# Patient Record
Sex: MALE | Race: WHITE | NOT HISPANIC OR LATINO | Employment: FULL TIME | URBAN - METROPOLITAN AREA
[De-identification: names, ages, dates, MRNs, and addresses within clinical notes are randomized per-mention and may not be internally consistent; named-entity substitution may affect disease eponyms.]

---

## 2017-01-30 ENCOUNTER — TELEPHONE (OUTPATIENT)
Dept: NEUROLOGY | Facility: HOSPITAL | Age: 62
End: 2017-01-30

## 2017-01-30 NOTE — TELEPHONE ENCOUNTER
Returned pt's call to inform that he needs to stop CapTem at least 2 weeks prior to TACE so that his platelets and WBC can return to normal. Pt verbalized understanding.

## 2017-01-30 NOTE — TELEPHONE ENCOUNTER
----- Message from Madiha Desir sent at 1/30/2017 10:46 AM CST -----  EAW- Patient wants to know if he needs to stop camptem medication before he has his second Tace on 3/10/17. Please call patient at [phone]

## 2017-02-16 DIAGNOSIS — C7B.8 SECONDARY NEUROENDOCRINE TUMOR OF LIVER: Primary | ICD-10-CM

## 2017-03-09 ENCOUNTER — OFFICE VISIT (OUTPATIENT)
Dept: NEUROLOGY | Facility: HOSPITAL | Age: 62
End: 2017-03-09
Attending: SURGERY
Payer: COMMERCIAL

## 2017-03-09 ENCOUNTER — LAB VISIT (OUTPATIENT)
Dept: LAB | Facility: HOSPITAL | Age: 62
End: 2017-03-09
Attending: SURGERY
Payer: COMMERCIAL

## 2017-03-09 VITALS
TEMPERATURE: 98 F | HEART RATE: 81 BPM | WEIGHT: 204 LBS | HEIGHT: 68 IN | DIASTOLIC BLOOD PRESSURE: 86 MMHG | SYSTOLIC BLOOD PRESSURE: 126 MMHG | BODY MASS INDEX: 30.92 KG/M2

## 2017-03-09 DIAGNOSIS — C7B.8 SECONDARY NEUROENDOCRINE TUMOR OF LIVER: ICD-10-CM

## 2017-03-09 DIAGNOSIS — C7B.8 SECONDARY NEUROENDOCRINE TUMOR OF DISTANT LYMPH NODES: ICD-10-CM

## 2017-03-09 DIAGNOSIS — C78.7 SECONDARY MALIGNANT NEOPLASM OF LIVER: ICD-10-CM

## 2017-03-09 LAB
ALBUMIN SERPL BCP-MCNC: 3.9 G/DL
ALP SERPL-CCNC: 109 U/L
ALT SERPL W/O P-5'-P-CCNC: 26 U/L
ANION GAP SERPL CALC-SCNC: 6 MMOL/L
AST SERPL-CCNC: 35 U/L
BASOPHILS # BLD AUTO: 0.03 K/UL
BASOPHILS NFR BLD: 0.5 %
BILIRUB SERPL-MCNC: 1.9 MG/DL
BUN SERPL-MCNC: 21 MG/DL
CALCIUM SERPL-MCNC: 9.5 MG/DL
CHLORIDE SERPL-SCNC: 105 MMOL/L
CO2 SERPL-SCNC: 26 MMOL/L
CREAT SERPL-MCNC: 1.1 MG/DL
DIFFERENTIAL METHOD: ABNORMAL
EOSINOPHIL # BLD AUTO: 0.1 K/UL
EOSINOPHIL NFR BLD: 2.4 %
ERYTHROCYTE [DISTWIDTH] IN BLOOD BY AUTOMATED COUNT: 15 %
EST. GFR  (AFRICAN AMERICAN): >60 ML/MIN/1.73 M^2
EST. GFR  (NON AFRICAN AMERICAN): >60 ML/MIN/1.73 M^2
GLUCOSE SERPL-MCNC: 188 MG/DL
HCT VFR BLD AUTO: 43.1 %
HGB BLD-MCNC: 14.8 G/DL
INR PPP: 1
LYMPHOCYTES # BLD AUTO: 1.1 K/UL
LYMPHOCYTES NFR BLD: 19.2 %
MCH RBC QN AUTO: 32.5 PG
MCHC RBC AUTO-ENTMCNC: 34.3 %
MCV RBC AUTO: 95 FL
MONOCYTES # BLD AUTO: 0.6 K/UL
MONOCYTES NFR BLD: 9.5 %
NEUTROPHILS # BLD AUTO: 4 K/UL
NEUTROPHILS NFR BLD: 68.1 %
PLATELET # BLD AUTO: 120 K/UL
PMV BLD AUTO: 9 FL
POTASSIUM SERPL-SCNC: 4.7 MMOL/L
PROT SERPL-MCNC: 9 G/DL
PROTHROMBIN TIME: 10.7 SEC
RBC # BLD AUTO: 4.56 M/UL
SODIUM SERPL-SCNC: 137 MMOL/L
WBC # BLD AUTO: 5.9 K/UL

## 2017-03-09 PROCEDURE — 36415 COLL VENOUS BLD VENIPUNCTURE: CPT

## 2017-03-09 PROCEDURE — 85610 PROTHROMBIN TIME: CPT

## 2017-03-09 PROCEDURE — 80053 COMPREHEN METABOLIC PANEL: CPT

## 2017-03-09 PROCEDURE — 85025 COMPLETE CBC W/AUTO DIFF WBC: CPT

## 2017-03-09 PROCEDURE — 99214 OFFICE O/P EST MOD 30 MIN: CPT | Performed by: SURGERY

## 2017-03-09 RX ORDER — MIDAZOLAM HYDROCHLORIDE 1 MG/ML
0.5 INJECTION INTRAMUSCULAR; INTRAVENOUS ONCE
Status: CANCELLED | OUTPATIENT
Start: 2017-03-09 | End: 2017-03-09

## 2017-03-09 RX ORDER — CHOLECALCIFEROL (VITAMIN D3) 50 MCG
TABLET ORAL
COMMUNITY

## 2017-03-09 RX ORDER — SODIUM CHLORIDE 9 MG/ML
500 INJECTION, SOLUTION INTRAVENOUS ONCE
Status: CANCELLED | OUTPATIENT
Start: 2017-03-09 | End: 2017-03-09

## 2017-03-09 RX ORDER — HYDROMORPHONE HCL IN 0.9% NACL 6 MG/30 ML
PATIENT CONTROLLED ANALGESIA SYRINGE INTRAVENOUS CONTINUOUS
Status: CANCELLED | OUTPATIENT
Start: 2017-03-09

## 2017-03-09 RX ORDER — DEXTROSE MONOHYDRATE, SODIUM CHLORIDE, AND POTASSIUM CHLORIDE 50; 1.49; 4.5 G/1000ML; G/1000ML; G/1000ML
INJECTION, SOLUTION INTRAVENOUS CONTINUOUS
Status: CANCELLED | OUTPATIENT
Start: 2017-03-09

## 2017-03-09 RX ORDER — LIDOCAINE HYDROCHLORIDE 10 MG/ML
1 INJECTION, SOLUTION EPIDURAL; INFILTRATION; INTRACAUDAL; PERINEURAL ONCE
Status: CANCELLED | OUTPATIENT
Start: 2017-03-09 | End: 2017-03-09

## 2017-03-09 RX ORDER — MAGNESIUM SULFATE/D5W 2 G/50 ML
2 INTRAVENOUS SOLUTION, PIGGYBACK (ML) INTRAVENOUS ONCE
Status: CANCELLED | OUTPATIENT
Start: 2017-03-09 | End: 2017-03-09

## 2017-03-09 RX ORDER — ONDANSETRON 2 MG/ML
4 INJECTION INTRAMUSCULAR; INTRAVENOUS EVERY 8 HOURS PRN
Status: CANCELLED | OUTPATIENT
Start: 2017-03-09

## 2017-03-09 RX ORDER — FAMOTIDINE 20 MG/1
20 TABLET, FILM COATED ORAL EVERY 12 HOURS
Status: CANCELLED | OUTPATIENT
Start: 2017-03-09

## 2017-03-09 RX ORDER — DEXAMETHASONE SODIUM PHOSPHATE 4 MG/ML
8 INJECTION, SOLUTION INTRA-ARTICULAR; INTRALESIONAL; INTRAMUSCULAR; INTRAVENOUS; SOFT TISSUE ONCE
Status: CANCELLED | OUTPATIENT
Start: 2017-03-09 | End: 2017-03-09

## 2017-03-09 RX ORDER — FENTANYL CITRATE 50 UG/ML
50 INJECTION, SOLUTION INTRAMUSCULAR; INTRAVENOUS ONCE
Status: CANCELLED | OUTPATIENT
Start: 2017-03-09 | End: 2017-03-09

## 2017-03-09 RX ORDER — MANNITOL 250 MG/ML
12.5 INJECTION, SOLUTION INTRAVENOUS ONCE
Status: CANCELLED | OUTPATIENT
Start: 2017-03-09 | End: 2017-03-09

## 2017-03-09 RX ORDER — IBUPROFEN 400 MG/1
400 TABLET ORAL EVERY 4 HOURS PRN
Status: CANCELLED | OUTPATIENT
Start: 2017-03-09

## 2017-03-09 RX ORDER — ONDANSETRON 2 MG/ML
4 INJECTION INTRAMUSCULAR; INTRAVENOUS EVERY 12 HOURS PRN
Status: CANCELLED | OUTPATIENT
Start: 2017-03-09

## 2017-03-09 RX ORDER — ALLOPURINOL 300 MG/1
300 TABLET ORAL DAILY
Status: CANCELLED | OUTPATIENT
Start: 2017-03-09 | End: 2017-03-11

## 2017-03-09 RX ORDER — DIPHENHYDRAMINE HYDROCHLORIDE 50 MG/ML
50 INJECTION INTRAMUSCULAR; INTRAVENOUS ONCE
Status: CANCELLED | OUTPATIENT
Start: 2017-03-09 | End: 2017-03-09

## 2017-03-09 RX ORDER — NALOXONE HCL 0.4 MG/ML
0.02 VIAL (ML) INJECTION
Status: CANCELLED | OUTPATIENT
Start: 2017-03-09

## 2017-03-09 NOTE — PATIENT INSTRUCTIONS
Nothing to eat or drink after midnight tonight.    Please report to 1st floor hospital admissions desk at 6 am.     *Remember to have labs (CBC & CMP) 10 days after TACE procedure- orders given to patient    Have CT scan 3 months from TACE     Call Sheila after scan report and CD have been sent to us and we will then add you to our tumor board to determine if a follow up TACE is needed

## 2017-03-09 NOTE — MR AVS SNAPSHOT
[facility]  [address] [place] [address]  Phone: [phone]  Fax: [phone]                  Tereso Simms   3/9/2017 11:30 AM   Office Visit    Description:  Male : 1955   Provider:  Richard Dai MD   Department:  [facility]           Reason for Visit     Follow-up           Diagnoses this Visit        Comments    Secondary malignant neoplasm of liver                To Do List           Future Appointments        Provider Department Dept Phone    3/10/2017 8:00 AM [facility] [email or web address]; [facility] IR1 [facility] [phone]    3/14/2017 9:30 AM Tereso Yuen DO, FACP [facility] [phone]      Your Future Surgeries/Procedures     Mar 10, 2017   Surgery with [facility] Diagnostic Provider   [facility] ([facility]    [address]  [place] [address] [phone]              Goals (5 Years of Data)     None      Follow-Up and Disposition     Return in about 3 months (around 2017).    Follow-up and Disposition History      [facility]     [facility] -  Assistance  Registered nurses in the [facility] provide clinical advisement, health education, appointment booking, and other advisory services.  Call for this free service at [phone].             Medications           Message regarding Medications     Verify the changes and/or additions to your medication regime listed below are the same as discussed with your clinician today.  If any of these changes or additions are incorrect, please notify your healthcare provider.             Verify that the below list of medications is an accurate representation of the medications you are currently taking.  If none reported, the list may be blank. If incorrect, please contact your healthcare provider. Carry this list with you in case of emergency.           Current Medications      "ascorbic acid, vitamin C, (VITAMIN C) 1000 MG tablet Take 1,000 mg by mouth once daily.    cholecalciferol, vitamin D3, 2,000 unit Tab 1 TABLET DAILY    lanreotide (SOMATULINE DEPOT) 120 mg/0.5 mL Syrg Inject 120 mg into the skin every 30 days.    lisinopril (PRINIVIL,ZESTRIL) 5 MG tablet Take 5 mg by mouth once daily.    ondansetron (ZOFRAN-ODT) 4 MG TbDL Take 8 mg by mouth as needed.    sitagliptan-metformin (JANUMET) 50-1,000 mg per tablet Take 1 tablet by mouth 2 (two) times daily with meals.    temozolomide (TEMODAR) 100 MG capsule Take 300 mg/m2/day by mouth every evening. Pt take 300mg 5 days out of a month    vitamin D 1000 units Tab Take 185 mg by mouth once daily.    ZOLEDRONIC ACID (ZOMETA IV) Inject into the vein every 30 days.    capecitabine (XELODA) 500 MG Tab Take 500 mg by mouth 2 (two) times daily. Pt ttake 1650mg in am/ 1500mg at dinner           Clinical Reference Information           Your Vitals Were     BP Pulse Temp Height Weight BMI    126/86 81 97.6 °F (36.4 °C) (Oral) 5' 8" (1.727 m) 92.5 kg (204 lb) 31.02 kg/m2      Blood Pressure          Most Recent Value    BP  126/86      Allergies as of 3/9/2017     Epinephrine      Immunizations Administered on Date of Encounter - 3/9/2017     None      Instructions    Nothing to eat or drink after midnight tonight.    Please report to 1st floor hospital admissions desk at 6 am.     *Remember to have labs (CBC & CMP) 10 days after TACE procedure- orders given to patient    Have CT scan 3 months from TACE     Call Sheila after scan report and CD have been sent to us and we will then add you to our tumor board to determine if a follow up TACE is needed        Language Assistance Services     ATTENTION: Language assistance services are available, free of charge. Please call [phone].      ATENCIÓN: Si habla español, tiene a garcia disposición servicios gratuitos de asistencia lingüística. Llame al [phone].     ALEJANDRO Ý: N?u b?n nói Ti?ng Vi?t, " có các d?ch v? h? tr? ngôn ng? mi?n phí dành cho b?n. G?i s? [phone].         [facility] complies with applicable Federal civil rights laws and does not discriminate on the basis of race, color, national origin, age, disability, or sex.

## 2017-03-09 NOTE — NURSING
Left a message for pt to arrive at 0600. advd npo at [place] and must take all meds with the exception of blood thinners and dm meds. advd to have a ride.

## 2017-03-09 NOTE — PROGRESS NOTES
"NOLANETS:  [facility]  A collaboration between [facility] and [facility]      PATIENT: Tereso Simms  MRN: [id number]  DATE: 3/9/2017    Subjective:      Chief Complaint: Follow-up (TACE 2)  for 2nd chemoembo    Vitals:   Vitals:    03/09/17 1113   BP: 126/86   Pulse: 81   Temp: 97.6 °F (36.4 °C)   TempSrc: Oral   Weight: 92.5 kg (204 lb)   Height: 5' 8" (1.727 m)        Karnofsky Score:     Diagnosis:   1. Secondary malignant neoplasm of liver         Oncologic History:     Interval History:     Past Medical History:  Past Medical History:   Diagnosis Date    Diabetes     Diabetes mellitus, type 2     Hypertension     Malignant carcinoid tumor of the ileum 10/5/2000    Secondary neuroendocrine tumor of bone 11/15/2016    Secondary neuroendocrine tumor of liver(209.72)     Secondary neuroendocrine tumor of other sites     pancreas       Past Surgical History:  Past Surgical History:   Procedure Laterality Date    APPENDECTOMY      CEREBRAL ANEURYSM REPAIR      clipped-NO MRI    right hepatic partial lobectomy, t non anatomic tumors removed, RFA right lobe, cholecystectomy, excision of pancreatic tumor, lysis of adhesions,  2/25/2009- [facility]    small bowel resection, lymph node resection, 21/38 positive, right colectomy, lymphovascular, perineural invasion  10/2000    TACE  11/2016    Y-90 microspheres  July 2012-[facility]       Family History:  Family History   Problem Relation Age of Onset    Heart failure Father     Diabetes Father     COPD Mother     Cancer Mother      RCC    Cancer Sister      breast       Allergies:  Review of patient's allergies indicates:   Allergen Reactions    Epinephrine Other (See Comments)     Precipitates a carcinoid crisis       Medications:  Current Outpatient Prescriptions   Medication Sig Dispense Refill    ascorbic acid, vitamin C, (VITAMIN C) 1000 MG tablet Take 1,000 " mg by mouth once daily.      cholecalciferol, vitamin D3, 2,000 unit Tab 1 TABLET DAILY      lanreotide (SOMATULINE DEPOT) 120 mg/0.5 mL Syrg Inject 120 mg into the skin every 30 days.      lisinopril (PRINIVIL,ZESTRIL) 5 MG tablet Take 5 mg by mouth once daily.      ondansetron (ZOFRAN-ODT) 4 MG TbDL Take 8 mg by mouth as needed.      sitagliptan-metformin (JANUMET) 50-1,000 mg per tablet Take 1 tablet by mouth 2 (two) times daily with meals.      temozolomide (TEMODAR) 100 MG capsule Take 300 mg/m2/day by mouth every evening. Pt take 300mg 5 days out of a month      vitamin D 1000 units Tab Take 185 mg by mouth once daily.      ZOLEDRONIC ACID (ZOMETA IV) Inject into the vein every 30 days.      capecitabine (XELODA) 500 MG Tab Take 500 mg by mouth 2 (two) times daily. Pt ttake 1650mg in am/ 1500mg at dinner       No current facility-administered medications for this visit.        Review of Systems   Constitutional: Negative for activity change, appetite change, chills, diaphoresis, fatigue, fever and unexpected weight change.   HENT: Negative for congestion, dental problem, drooling, ear discharge, ear pain, facial swelling, hearing loss, mouth sores, nosebleeds, rhinorrhea, sinus pressure, sneezing, sore throat, tinnitus and trouble swallowing.    Eyes: Negative for photophobia, pain, discharge, redness, itching and visual disturbance.   Respiratory: Negative for apnea, cough, choking, chest tightness, shortness of breath, wheezing and stridor.    Cardiovascular: Negative for chest pain, palpitations and leg swelling.   Gastrointestinal: Negative for abdominal pain, anal bleeding, blood in stool and constipation.   Endocrine: Negative.    Genitourinary: Negative.    Musculoskeletal: Negative.    Allergic/Immunologic: Negative.    Neurological: Negative.    Hematological: Negative.    Psychiatric/Behavioral: Negative.       Objective:      Physical Exam   Constitutional: He appears well-developed and  well-nourished.   HENT:   Head: Atraumatic.   Eyes: EOM are normal.   Neck: Neck supple.   Cardiovascular: Normal rate and regular rhythm.    Pulmonary/Chest: Effort normal and breath sounds normal.   Abdominal: Soft. Bowel sounds are normal.   Musculoskeletal: Normal range of motion.   Neurological: He is alert.      Assessment:       1. Secondary malignant neoplasm of liver        Laboratory Data:   Results for MAHAD PEREZ (MRN [id number]) as of 3/9/2017 11:57   Ref. Range 3/2/2017 15:48 3/9/2017 10:39   WBC Latest Ref Range: 3.90 - 12.70 K/uL  5.90   RBC Latest Ref Range: 4.60 - 6.20 M/uL  4.56 (L)   Hemoglobin Latest Ref Range: 14.0 - 18.0 g/dL  14.8   Hematocrit Latest Ref Range: 40.0 - 54.0 %  43.1   MCV Latest Ref Range: 82 - 98 fL  95   MCH Latest Ref Range: 27.0 - 31.0 pg  32.5 (H)   MCHC Latest Ref Range: 32.0 - 36.0 %  34.3   RDW Latest Ref Range: 11.5 - 14.5 %  15.0 (H)   Platelets Latest Ref Range: 150 - 350 K/uL  120 (L)   MPV Latest Ref Range: 9.2 - 12.9 fL  9.0 (L)   Gran% Latest Ref Range: 38.0 - 73.0 %  68.1   Gran # Latest Ref Range: 1.8 - 7.7 K/uL  4.0   Lymph% Latest Ref Range: 18.0 - 48.0 %  19.2   Lymph # Latest Ref Range: 1.0 - 4.8 K/uL  1.1   Mono% Latest Ref Range: 4.0 - 15.0 %  9.5   Mono # Latest Ref Range: 0.3 - 1.0 K/uL  0.6   Eosinophil% Latest Ref Range: 0.0 - 8.0 %  2.4   Eos # Latest Ref Range: 0.0 - 0.5 K/uL  0.1   Basophil% Latest Ref Range: 0.0 - 1.9 %  0.5   Baso # Latest Ref Range: 0.00 - 0.20 K/uL  0.03   Protime Latest Ref Range: 9.0 - 12.5 sec  10.7   Coumadin Monitoring INR Latest Ref Range: 0.8 - 1.2   1.0   Sodium Latest Ref Range: 136 - 145 mmol/L  137   Potassium Latest Ref Range: 3.5 - 5.1 mmol/L  4.7   Chloride Latest Ref Range: 95 - 110 mmol/L  105   CO2 Latest Ref Range: 23 - 29 mmol/L  26   Anion Gap Latest Ref Range: 8 - 16 mmol/L  6 (L)   BUN, Bld Latest Ref Range: 8 - 23 mg/dL  21   Creatinine Latest Ref Range: 0.5 - 1.4 mg/dL  1.1   eGFR if non African American  Latest Ref Range: >60 mL/min/1.73 m^2  >60   eGFR if  Latest Ref Range: >60 mL/min/1.73 m^2  >60   Glucose Latest Ref Range: 70 - 110 mg/dL  188 (H)   Calcium Latest Ref Range: 8.7 - 10.5 mg/dL  9.5   Alkaline Phosphatase Latest Ref Range: 55 - 135 U/L  109   Total Protein Latest Ref Range: 6.0 - 8.4 g/dL  9.0 (H)   Albumin Latest Ref Range: 3.5 - 5.2 g/dL  3.9   Total Bilirubin Latest Ref Range: 0.1 - 1.0 mg/dL  1.9 (H)   AST Latest Ref Range: 10 - 40 U/L  35   ALT Latest Ref Range: 10 - 44 U/L  26   CT PREVIOUS Unknown Rpt    Differential Method Unknown  Automated       Impression:  Plan:       Metastatic carcinoid tumour    Doing well      For 2nd chemoembolization    Discussed plan and risks                EMMA Dai MD, FACS   Associate Professor of Surgery, [facility]   Neuroendocrine Surgery, Hepatic/Pancreatic & General Surgery   [address], [address]   [place] [address]   ph. [phone]; [phone]   fax. [phone]

## 2017-03-10 ENCOUNTER — SURGERY (OUTPATIENT)
Age: 62
End: 2017-03-10

## 2017-03-10 ENCOUNTER — HOSPITAL ENCOUNTER (OUTPATIENT)
Facility: HOSPITAL | Age: 62
Discharge: HOME | End: 2017-03-11
Attending: SURGERY | Admitting: SURGERY
Payer: COMMERCIAL

## 2017-03-10 DIAGNOSIS — C7B.8 SECONDARY NEUROENDOCRINE TUMOR OF LIVER: ICD-10-CM

## 2017-03-10 DIAGNOSIS — C7A.012 MALIGNANT CARCINOID TUMOR OF THE ILEUM: ICD-10-CM

## 2017-03-10 DIAGNOSIS — C7B.8 SECONDARY NEUROENDOCRINE TUMOR OF DISTANT LYMPH NODES: ICD-10-CM

## 2017-03-10 DIAGNOSIS — C7B.8 SECONDARY NEUROENDOCRINE TUMOR OF OTHER SITES: Primary | ICD-10-CM

## 2017-03-10 DIAGNOSIS — C7B.8 SECONDARY NEUROENDOCRINE TUMOR OF BONE(209.73): ICD-10-CM

## 2017-03-10 DIAGNOSIS — C78.7 SECONDARY MALIGNANT NEOPLASM OF LIVER: ICD-10-CM

## 2017-03-10 LAB — GLUCOSE SERPL-MCNC: 183 MG/DL (ref 70–110)

## 2017-03-10 PROCEDURE — 25000003 PHARM REV CODE 250

## 2017-03-10 PROCEDURE — 94761 N-INVAS EAR/PLS OXIMETRY MLT: CPT

## 2017-03-10 PROCEDURE — 63600175 PHARM REV CODE 636 W HCPCS: Performed by: SURGERY

## 2017-03-10 PROCEDURE — 25000003 PHARM REV CODE 250: Performed by: SURGERY

## 2017-03-10 PROCEDURE — 63600175 PHARM REV CODE 636 W HCPCS: Performed by: RADIOLOGY

## 2017-03-10 PROCEDURE — 25500020 PHARM REV CODE 255: Performed by: SURGERY

## 2017-03-10 PROCEDURE — 63600175 PHARM REV CODE 636 W HCPCS

## 2017-03-10 RX ORDER — OXYCODONE AND ACETAMINOPHEN 5; 325 MG/1; MG/1
2 TABLET ORAL EVERY 4 HOURS PRN
Status: DISCONTINUED | OUTPATIENT
Start: 2017-03-10 | End: 2017-03-11 | Stop reason: HOSPADM

## 2017-03-10 RX ORDER — ONDANSETRON 2 MG/ML
4 INJECTION INTRAMUSCULAR; INTRAVENOUS EVERY 8 HOURS PRN
Status: DISCONTINUED | OUTPATIENT
Start: 2017-03-10 | End: 2017-03-10 | Stop reason: SDUPTHER

## 2017-03-10 RX ORDER — FUROSEMIDE 10 MG/ML
20 INJECTION INTRAMUSCULAR; INTRAVENOUS ONCE
Status: COMPLETED | OUTPATIENT
Start: 2017-03-10 | End: 2017-03-10

## 2017-03-10 RX ORDER — DEXTROSE MONOHYDRATE, SODIUM CHLORIDE, AND POTASSIUM CHLORIDE 50; 1.49; 4.5 G/1000ML; G/1000ML; G/1000ML
INJECTION, SOLUTION INTRAVENOUS CONTINUOUS
Status: DISCONTINUED | OUTPATIENT
Start: 2017-03-10 | End: 2017-03-11 | Stop reason: HOSPADM

## 2017-03-10 RX ORDER — DEXAMETHASONE SODIUM PHOSPHATE 4 MG/ML
8 INJECTION, SOLUTION INTRA-ARTICULAR; INTRALESIONAL; INTRAMUSCULAR; INTRAVENOUS; SOFT TISSUE ONCE
Status: DISCONTINUED | OUTPATIENT
Start: 2017-03-10 | End: 2017-03-10 | Stop reason: SDUPTHER

## 2017-03-10 RX ORDER — FAMOTIDINE 20 MG/1
20 TABLET, FILM COATED ORAL EVERY 12 HOURS
Status: DISCONTINUED | OUTPATIENT
Start: 2017-03-10 | End: 2017-03-10 | Stop reason: SDUPTHER

## 2017-03-10 RX ORDER — LIDOCAINE HYDROCHLORIDE 10 MG/ML
1 INJECTION, SOLUTION EPIDURAL; INFILTRATION; INTRACAUDAL; PERINEURAL ONCE
Status: DISCONTINUED | OUTPATIENT
Start: 2017-03-10 | End: 2017-03-10 | Stop reason: HOSPADM

## 2017-03-10 RX ORDER — DEXTROSE MONOHYDRATE, SODIUM CHLORIDE, AND POTASSIUM CHLORIDE 50; 1.49; 4.5 G/1000ML; G/1000ML; G/1000ML
INJECTION, SOLUTION INTRAVENOUS CONTINUOUS
Status: DISCONTINUED | OUTPATIENT
Start: 2017-03-10 | End: 2017-03-10 | Stop reason: SDUPTHER

## 2017-03-10 RX ORDER — FENTANYL CITRATE 50 UG/ML
INJECTION, SOLUTION INTRAMUSCULAR; INTRAVENOUS CODE/TRAUMA/SEDATION MEDICATION
Status: COMPLETED | OUTPATIENT
Start: 2017-03-10 | End: 2017-03-10

## 2017-03-10 RX ORDER — PROMETHAZINE HYDROCHLORIDE 25 MG/ML
INJECTION, SOLUTION INTRAMUSCULAR; INTRAVENOUS CODE/TRAUMA/SEDATION MEDICATION
Status: COMPLETED | OUTPATIENT
Start: 2017-03-10 | End: 2017-03-10

## 2017-03-10 RX ORDER — ALLOPURINOL 100 MG/1
300 TABLET ORAL DAILY
Status: COMPLETED | OUTPATIENT
Start: 2017-03-10 | End: 2017-03-11

## 2017-03-10 RX ORDER — MIDAZOLAM HYDROCHLORIDE 1 MG/ML
0.5 INJECTION INTRAMUSCULAR; INTRAVENOUS ONCE
Status: DISCONTINUED | OUTPATIENT
Start: 2017-03-10 | End: 2017-03-10 | Stop reason: HOSPADM

## 2017-03-10 RX ORDER — MAGNESIUM SULFATE HEPTAHYDRATE 40 MG/ML
2 INJECTION, SOLUTION INTRAVENOUS ONCE
Status: DISCONTINUED | OUTPATIENT
Start: 2017-03-10 | End: 2017-03-10 | Stop reason: HOSPADM

## 2017-03-10 RX ORDER — DEXAMETHASONE SODIUM PHOSPHATE 4 MG/ML
8 INJECTION, SOLUTION INTRA-ARTICULAR; INTRALESIONAL; INTRAMUSCULAR; INTRAVENOUS; SOFT TISSUE ONCE
Status: COMPLETED | OUTPATIENT
Start: 2017-03-10 | End: 2017-03-10

## 2017-03-10 RX ORDER — FENTANYL CITRATE 50 UG/ML
50 INJECTION, SOLUTION INTRAMUSCULAR; INTRAVENOUS ONCE
Status: DISCONTINUED | OUTPATIENT
Start: 2017-03-10 | End: 2017-03-10 | Stop reason: HOSPADM

## 2017-03-10 RX ORDER — SODIUM CHLORIDE 9 MG/ML
500 INJECTION, SOLUTION INTRAVENOUS ONCE
Status: COMPLETED | OUTPATIENT
Start: 2017-03-10 | End: 2017-03-10

## 2017-03-10 RX ORDER — DIPHENHYDRAMINE HYDROCHLORIDE 50 MG/ML
50 INJECTION INTRAMUSCULAR; INTRAVENOUS ONCE
Status: COMPLETED | OUTPATIENT
Start: 2017-03-10 | End: 2017-03-10

## 2017-03-10 RX ORDER — ZOLPIDEM TARTRATE 5 MG/1
5 TABLET ORAL NIGHTLY
Status: DISCONTINUED | OUTPATIENT
Start: 2017-03-10 | End: 2017-03-11 | Stop reason: HOSPADM

## 2017-03-10 RX ORDER — IBUPROFEN 400 MG/1
400 TABLET ORAL EVERY 4 HOURS PRN
Status: DISCONTINUED | OUTPATIENT
Start: 2017-03-10 | End: 2017-03-10 | Stop reason: SDUPTHER

## 2017-03-10 RX ORDER — MANNITOL 250 MG/ML
12.5 INJECTION, SOLUTION INTRAVENOUS ONCE
Status: COMPLETED | OUTPATIENT
Start: 2017-03-10 | End: 2017-03-10

## 2017-03-10 RX ORDER — ONDANSETRON 2 MG/ML
4 INJECTION INTRAMUSCULAR; INTRAVENOUS EVERY 8 HOURS PRN
Status: DISCONTINUED | OUTPATIENT
Start: 2017-03-10 | End: 2017-03-11 | Stop reason: HOSPADM

## 2017-03-10 RX ORDER — FAMOTIDINE 20 MG/1
20 TABLET, FILM COATED ORAL EVERY 12 HOURS
Status: DISCONTINUED | OUTPATIENT
Start: 2017-03-10 | End: 2017-03-11 | Stop reason: HOSPADM

## 2017-03-10 RX ORDER — IBUPROFEN 400 MG/1
400 TABLET ORAL EVERY 4 HOURS PRN
Status: DISCONTINUED | OUTPATIENT
Start: 2017-03-10 | End: 2017-03-11 | Stop reason: HOSPADM

## 2017-03-10 RX ORDER — URSODIOL 300 MG/1
300 CAPSULE ORAL 2 TIMES DAILY
Status: DISCONTINUED | OUTPATIENT
Start: 2017-03-10 | End: 2017-03-11 | Stop reason: HOSPADM

## 2017-03-10 RX ORDER — ALLOPURINOL 100 MG/1
300 TABLET ORAL DAILY
Status: DISCONTINUED | OUTPATIENT
Start: 2017-03-10 | End: 2017-03-10 | Stop reason: SDUPTHER

## 2017-03-10 RX ORDER — MIDAZOLAM HYDROCHLORIDE 1 MG/ML
INJECTION, SOLUTION INTRAMUSCULAR; INTRAVENOUS CODE/TRAUMA/SEDATION MEDICATION
Status: COMPLETED | OUTPATIENT
Start: 2017-03-10 | End: 2017-03-10

## 2017-03-10 RX ADMIN — MIDAZOLAM 1 MG: 1 INJECTION INTRAMUSCULAR; INTRAVENOUS at 08:03

## 2017-03-10 RX ADMIN — DIPHENHYDRAMINE HYDROCHLORIDE 50 MG: 50 INJECTION, SOLUTION INTRAMUSCULAR; INTRAVENOUS at 06:03

## 2017-03-10 RX ADMIN — ONDANSETRON HYDROCHLORIDE 16 MG: 2 SOLUTION INTRAMUSCULAR; INTRAVENOUS at 06:03

## 2017-03-10 RX ADMIN — ZOLPIDEM TARTRATE 5 MG: 5 TABLET, FILM COATED ORAL at 08:03

## 2017-03-10 RX ADMIN — URSODIOL 300 MG: 300 CAPSULE ORAL at 08:03

## 2017-03-10 RX ADMIN — DEXTROSE MONOHYDRATE, SODIUM CHLORIDE, AND POTASSIUM CHLORIDE: 50; 4.5; 1.49 INJECTION, SOLUTION INTRAVENOUS at 01:03

## 2017-03-10 RX ADMIN — DEXAMETHASONE SODIUM PHOSPHATE 8 MG: 4 INJECTION, SOLUTION INTRAMUSCULAR; INTRAVENOUS at 01:03

## 2017-03-10 RX ADMIN — PROMETHAZINE HYDROCHLORIDE 12.5 MG: 25 INJECTION INTRAMUSCULAR; INTRAVENOUS at 08:03

## 2017-03-10 RX ADMIN — MITOMYCIN: 5 INJECTION, POWDER, LYOPHILIZED, FOR SOLUTION INTRAVENOUS at 09:03

## 2017-03-10 RX ADMIN — DEXTROSE MONOHYDRATE, SODIUM CHLORIDE, AND POTASSIUM CHLORIDE: 50; 4.5; 1.49 INJECTION, SOLUTION INTRAVENOUS at 03:03

## 2017-03-10 RX ADMIN — SODIUM CHLORIDE 3 G: 9 INJECTION, SOLUTION INTRAVENOUS at 01:03

## 2017-03-10 RX ADMIN — SODIUM CHLORIDE 500 ML: 0.9 INJECTION, SOLUTION INTRAVENOUS at 06:03

## 2017-03-10 RX ADMIN — FAMOTIDINE 20 MG: 20 TABLET, FILM COATED ORAL at 08:03

## 2017-03-10 RX ADMIN — URSODIOL 300 MG: 300 CAPSULE ORAL at 02:03

## 2017-03-10 RX ADMIN — MANNITOL 12.5 G: 12.5 INJECTION, SOLUTION INTRAVENOUS at 09:03

## 2017-03-10 RX ADMIN — OCTREOTIDE ACETATE 500 MCG/HR: 1000 INJECTION, SOLUTION INTRAVENOUS; SUBCUTANEOUS at 06:03

## 2017-03-10 RX ADMIN — SODIUM CHLORIDE 3 G: 9 INJECTION, SOLUTION INTRAVENOUS at 07:03

## 2017-03-10 RX ADMIN — OCTREOTIDE ACETATE 125 MCG/HR: 1000 INJECTION, SOLUTION INTRAVENOUS; SUBCUTANEOUS at 01:03

## 2017-03-10 RX ADMIN — PROMETHAZINE HYDROCHLORIDE 12.5 MG: 25 INJECTION INTRAMUSCULAR; INTRAVENOUS at 09:03

## 2017-03-10 RX ADMIN — FENTANYL CITRATE 50 MCG: 50 INJECTION, SOLUTION INTRAMUSCULAR; INTRAVENOUS at 08:03

## 2017-03-10 RX ADMIN — OCTREOTIDE ACETATE 500 MCG: 500 INJECTION, SOLUTION INTRAVENOUS; SUBCUTANEOUS at 06:03

## 2017-03-10 RX ADMIN — FUROSEMIDE 20 MG: 10 INJECTION, SOLUTION INTRAMUSCULAR; INTRAVENOUS at 04:03

## 2017-03-10 RX ADMIN — SODIUM CHLORIDE 3 G: 9 INJECTION, SOLUTION INTRAVENOUS at 06:03

## 2017-03-10 RX ADMIN — ALLOPURINOL 300 MG: 300 TABLET ORAL at 01:03

## 2017-03-10 RX ADMIN — FAMOTIDINE 20 MG: 20 TABLET, FILM COATED ORAL at 01:03

## 2017-03-10 RX ADMIN — DEXTROSE MONOHYDRATE, SODIUM CHLORIDE, AND POTASSIUM CHLORIDE: 50; 4.5; 1.49 INJECTION, SOLUTION INTRAVENOUS at 06:03

## 2017-03-10 NOTE — PROCEDURES
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  A microcatheter was introduced into feeding arteries of a left hepatic lobe tumor and a combination of gelfoam slurry, doxorubicin and mitomycin were administered until near stagnant flow was achieved.  Post procedural angiography revealed no complications.    Right femoral artery angiogram was performed and the sheath was removed.  Hemostasis was achieved using Exoseal technique.  There was no hematoma at the time of hemostasis.    The patient tolerated procedure well.  Please see Imaging report for further details.    Ran Sánchez M.D.  Diagnostic and Interventional Radiologist  Department of Radiology  Pager: [phone]

## 2017-03-10 NOTE — IP AVS SNAPSHOT
[facility]  [address]  [place] [address]  Phone: [phone]           Patient Discharge Instructions     Our goal is to set you up for success. This packet includes information on your condition, medications, and your home care. It will help you to care for yourself so you don't get sicker and need to go back to the hospital.     Please ask your nurse if you have any questions.        There are many details to remember when preparing to leave the hospital. Here is what you will need to do:    1. Take your medicine. If you are prescribed medications, review your Medication List in the following pages. You may have new medications to  at the pharmacy and others that you'll need to stop taking. Review the instructions for how and when to take your medications. Talk with your doctor or nurses if you are unsure of what to do.     2. Go to your follow-up appointments. Specific follow-up information is listed in the following pages. Your may be contacted by a transition nurse or clinical provider about future appointments. Be sure we have all of the phone numbers to reach you, if needed. Please contact your provider's office if you are unable to make an appointment.     3. Watch for warning signs. Your doctor or nurse will give you detailed warning signs to watch for and when to call for assistance. These instructions may also include educational information about your condition. If you experience any of warning signs to your health, call your doctor.               ** Verify the list of medication(s) below is accurate and up to date. Carry this with you in case of emergency. If your medications have changed, please notify your healthcare provider.             Medication List      START taking these medications        Additional Info                      ciprofloxacin HCl 500 MG tablet   Commonly known as:  CIPRO   Quantity:  14 tablet   Refills:  0   Dose:  500 mg    Instructions:  Take 1  tablet (500 mg total) by mouth 2 (two) times daily.     Begin Date    AM    Noon    PM    Bedtime       ondansetron 4 MG tablet   Commonly known as:  ZOFRAN   Quantity:  40 tablet   Refills:  0   Dose:  4 mg    Instructions:  Take 1 tablet (4 mg total) by mouth every 6 (six) hours as needed for Nausea.     Begin Date    AM    Noon    PM    Bedtime       tramadol 50 mg tablet   Commonly known as:  ULTRAM   Quantity:  20 tablet   Refills:  0   Dose:  50 mg    Instructions:  Take 1 tablet (50 mg total) by mouth every 6 (six) hours as needed for Pain.     Begin Date    AM    Noon    PM    Bedtime         CONTINUE taking these medications        Additional Info                      capecitabine 500 MG Tab   Commonly known as:  XELODA   Refills:  0   Dose:  500 mg    Instructions:  Take 500 mg by mouth 2 (two) times daily. Pt ttake 1650mg in am/ 1500mg at dinner     Begin Date    AM    Noon    PM    Bedtime       lanreotide 120 mg/0.5 mL Syrg   Commonly known as:  SOMATULINE DEPOT   Refills:  0   Dose:  120 mg    Instructions:  Inject 120 mg into the skin every 30 days.     Begin Date    AM    Noon    PM    Bedtime       lisinopril 5 MG tablet   Commonly known as:  PRINIVIL,ZESTRIL   Refills:  0   Dose:  5 mg    Instructions:  Take 5 mg by mouth once daily.     Begin Date    AM    Noon    PM    Bedtime       ondansetron 4 MG Tbdl   Commonly known as:  ZOFRAN-ODT   Refills:  0   Dose:  8 mg    Instructions:  Take 8 mg by mouth as needed.     Begin Date    AM    Noon    PM    Bedtime       SITagliptan-metformin 50-1,000 mg per tablet   Commonly known as:  JANUMET   Refills:  0   Dose:  1 tablet    Instructions:  Take 1 tablet by mouth 2 (two) times daily with meals.     Begin Date    AM    Noon    PM    Bedtime       temozolomide 100 MG capsule   Commonly known as:  TEMODAR   Refills:  0   Dose:  300 mg/m2/day    Instructions:  Take 300 mg/m2/day by mouth every evening. Pt take 300mg 5 days out of a month     Begin Date     AM    Noon    PM    Bedtime       VITAMIN C 1000 MG tablet   Refills:  0   Dose:  1000 mg   Generic drug:  ascorbic acid (vitamin C)    Instructions:  Take 1,000 mg by mouth once daily.     Begin Date    AM    Noon    PM    Bedtime       * cholecalciferol (vitamin D3) 2,000 unit Tab   Refills:  0    Instructions:  1 TABLET DAILY     Begin Date    AM    Noon    PM    Bedtime       * vitamin D 1000 units Tab   Refills:  0   Dose:  185 mg    Instructions:  Take 185 mg by mouth once daily.     Begin Date    AM    Noon    PM    Bedtime       ZOMETA IV   Refills:  0    Instructions:  Inject into the vein every 30 days.     Begin Date    AM    Noon    PM    Bedtime       * Notice:  This list has 2 medication(s) that are the same as other medications prescribed for you. Read the directions carefully, and ask your doctor or other care provider to review them with you.         Where to Get Your Medications      You can get these medications from any pharmacy     Bring a paper prescription for each of these medications     ciprofloxacin HCl 500 MG tablet    ondansetron 4 MG tablet    tramadol 50 mg tablet                  Please bring to all follow up appointments:    1. A copy of your discharge instructions.  2. All medicines you are currently taking in their original bottles.  3. Identification and insurance card.    Please arrive 15 minutes ahead of scheduled appointment time.    Please call 24 hours in advance if you must reschedule your appointment and/or time.        Your Scheduled Appointments     Mar 14, 2017  9:30 AM CDT   Established Patient Visit with Tereso Yuen DO, FACP   [facility]    [address] [place] [address]   [phone]              Follow-up Information     Follow up with Tereso Yuen DO, FACP In 3 months.    Specialty:  Hematology and Oncology    Why:  OR as directed by Dr Yuen    Contact information:    [address]  [facility]  "[id number]  [phone]          Discharge Instructions     Future Orders    Activity as tolerated     Call MD for:  persistent nausea and vomiting or diarrhea     Call MD for:  redness, tenderness, or signs of infection (pain, swelling, redness, odor or green/yellow discharge around incision site)     Call MD for:  severe uncontrolled pain     Call MD for:  temperature >100.4     Diet general     Questions:    Total calories:      Fat restriction, if any:      Protein restriction, if any:      Na restriction, if any:      Fluid restriction:      Additional restrictions:      No dressing needed         Admission Information     Date & Time Department CSN    3/10/2017  5:57 AM [facility] [id number]      Care Providers     Not on file      Your Vitals Were     BP Pulse Temp Resp Height Weight    142/85 (BP Location: Left arm, Patient Position: Lying, BP Method: Automatic) 69 96.4 °F (35.8 °C) (Oral) 18 5' 8" (1.727 m) 92.5 kg (204 lb)    SpO2 BMI             98% 31.02 kg/m2         Recent Lab Values        6/3/2016                                     A1C 8.0 (A)                       Allergies as of 3/11/2017        Reactions    Epinephrine Other (See Comments)    Precipitates a carcinoid crisis      [facility]     [facility] Care [facility] - 24/7 Assistance  Unless otherwise directed by your provider, please contact [facility], our nurse care line that is available for 24/7 assistance.     Registered nurses in the [facility] provide clinical advisement, health education, appointment booking, and other advisory services.  Call for this free service at [phone].        Advance Directives     An advance directive is a document which, in the event you are no longer able to make decisions for yourself, tells your healthcare team what kind of treatment you do or do not want to receive, or who you would like to make those decisions for you.  If you do not currently have an " advance directive, [facility] encourages you to create one.  For more information call:  [phone]), [phone] ([phone]),  or log on to [email or web address].        Language Assistance Services     ATTENTION: Language assistance services are available, free of charge. Please call [phone].      ATENCIÓN: Si habla español, tiene a garcia disposición servicios gratuitos de asistencia lingüística. Llame al [phone].     CHÚ Ý: N?u b?n nói Ti?ng Vi?t, có các d?ch v? h? tr? ngôn ng? mi?n phí dành cho b?n. G?i s? [phone].         [facility] complies with applicable Federal civil rights laws and does not discriminate on the basis of race, color, national origin, age, disability, or sex.

## 2017-03-10 NOTE — H&P
Radiology History & Physical      SUBJECTIVE:     Chief Complaint: Metastatic NET    History of Present Illness:  Tereso Simms is a 61 y.o. male who presents for TACE #2 to [facility].  Past Medical History:   Diagnosis Date    Diabetes     Diabetes mellitus, type 2     Hypertension     Malignant carcinoid tumor of the ileum 10/05/2000    Secondary neuroendocrine tumor of bone 11/15/2016    Secondary neuroendocrine tumor of liver     Secondary neuroendocrine tumor of other sites     pancreas     Past Surgical History:   Procedure Laterality Date    APPENDECTOMY      CEREBRAL ANEURYSM REPAIR      clipped-NO MRI    right hepatic partial lobectomy, t non anatomic tumors removed, RFA right lobe, cholecystectomy, excision of pancreatic tumor, lysis of adhesions,  2/25/2009- [facility]    small bowel resection, lymph node resection, 21/38 positive, right colectomy, lymphovascular, perineural invasion  10/2000    TACE  11/2016    Y-90 microspheres  July 2012-[facility] Meds:   Prior to Admission medications    Medication Sig Start Date End Date Taking? Authorizing Provider   ascorbic acid, vitamin C, (VITAMIN C) 1000 MG tablet Take 1,000 mg by mouth once daily.   Yes Historical Provider, MD   capecitabine (XELODA) 500 MG Tab Take 500 mg by mouth 2 (two) times daily. Pt ttake 1650mg in am/ 1500mg at dinner   Yes Historical Provider, MD   cholecalciferol, vitamin D3, 2,000 unit Tab 1 TABLET DAILY   Yes Historical Provider, MD   lanreotide (SOMATULINE DEPOT) 120 mg/0.5 mL Syrg Inject 120 mg into the skin every 30 days.   Yes Historical Provider, MD   lisinopril (PRINIVIL,ZESTRIL) 5 MG tablet Take 5 mg by mouth once daily.   Yes Historical Provider, MD   ondansetron (ZOFRAN-ODT) 4 MG TbDL Take 8 mg by mouth as needed.   Yes Historical Provider, MD   sitagliptan-metformin (JANUMET) 50-1,000 mg per tablet Take 1 tablet by mouth 2 (two) times daily with meals.   Yes Historical Provider, MD   temozolomide (TEMODAR)  100 MG capsule Take 300 mg/m2/day by mouth every evening. Pt take 300mg 5 days out of a month   Yes Historical Provider, MD   vitamin D 1000 units Tab Take 185 mg by mouth once daily.   Yes Historical Provider, MD   ZOLEDRONIC ACID (ZOMETA IV) Inject into the vein every 30 days.   Yes Historical Provider, MD     Anticoagulants/Antiplatelets: no anticoagulation    Allergies:   Review of patient's allergies indicates:   Allergen Reactions    Epinephrine Other (See Comments)     Precipitates a carcinoid crisis     Sedation History:  no adverse reactions    Review of Systems:   Hematological: no known coagulopathies  Respiratory: no shortness of breath  Cardiovascular: no chest pain  Gastrointestinal: no abdominal pain  Genito-Urinary: no dysuria  Musculoskeletal: negative  Neurological: no TIA or stroke symptoms         OBJECTIVE:     Vital Signs (Most Recent)  Temp: 98.3 °F (36.8 °C) (03/10/17 0700)  Pulse: 73 (03/10/17 0831)  Resp: 16 (03/10/17 0831)  BP: (!) 142/81 (03/10/17 0831)  SpO2: 100 % (03/10/17 0831)    Physical Exam:  ASA: 2  Mallampati: 2    General: no acute distress  Mental Status: alert and oriented to person, place and time  HEENT: normocephalic, atraumatic  Chest: unlabored breathing  Heart: regular heart rate  Abdomen: nondistended  Extremity: moves all extremities    Laboratory  Lab Results   Component Value Date    INR 1.0 03/09/2017       Lab Results   Component Value Date    WBC 5.90 03/09/2017    HGB 14.8 03/09/2017    HCT 43.1 03/09/2017    MCV 95 03/09/2017     (L) 03/09/2017      Lab Results   Component Value Date     (H) 03/09/2017     03/09/2017    K 4.7 03/09/2017     03/09/2017    CO2 26 03/09/2017    BUN 21 03/09/2017    CREATININE 1.1 03/09/2017    CALCIUM 9.5 03/09/2017    MG 1.9 02/08/2009    ALT 26 03/09/2017    AST 35 03/09/2017    ALBUMIN 3.9 03/09/2017    BILITOT 1.9 (H) 03/09/2017       ASSESSMENT/PLAN:     Sedation Plan: Moderate.  Patient will  undergo TACE to [facility] via Rt CFA. F/U Scans show improvement in liver lesions after first TACE. Will likely need 3rd TACE to residual disease in [facility] territory. Will eval for possible extrahepatic supply. Will need MRI as f/u scan.    Ran Sánchez M.D.  Diagnostic and Interventional Radiologist  Department of Radiology  Pager: [phone]

## 2017-03-10 NOTE — PROGRESS NOTES
Pt arrived to room, transferred to hospital bed safely, no acute distress noted, denies pain/nausea.  Dressing to right groin, clean, dry, intact, no drainage.  Pt oriented to room and call light. Safety maintained, bed in lowest position, wheels locked, call light in reach, family at bedside, will continue to monitor and reassess.

## 2017-03-10 NOTE — NURSING
Patient resting comfortably in bed, no complaints, vitals stable, will continue to monitor. Right groin soft, bandage clean dry and intact, no hematoma present. DP pulses bilaterally.

## 2017-03-11 VITALS
DIASTOLIC BLOOD PRESSURE: 85 MMHG | BODY MASS INDEX: 30.92 KG/M2 | WEIGHT: 204 LBS | SYSTOLIC BLOOD PRESSURE: 142 MMHG | RESPIRATION RATE: 18 BRPM | TEMPERATURE: 96 F | HEART RATE: 69 BPM | HEIGHT: 68 IN | OXYGEN SATURATION: 98 %

## 2017-03-11 PROCEDURE — 25000003 PHARM REV CODE 250: Performed by: SURGERY

## 2017-03-11 PROCEDURE — 63600175 PHARM REV CODE 636 W HCPCS: Performed by: SURGERY

## 2017-03-11 PROCEDURE — 94761 N-INVAS EAR/PLS OXIMETRY MLT: CPT

## 2017-03-11 RX ORDER — TRAMADOL HYDROCHLORIDE 50 MG/1
50 TABLET ORAL EVERY 6 HOURS PRN
Qty: 20 TABLET | Refills: 0 | Status: SHIPPED | OUTPATIENT
Start: 2017-03-11 | End: 2017-03-21

## 2017-03-11 RX ORDER — ONDANSETRON 4 MG/1
4 TABLET, FILM COATED ORAL EVERY 6 HOURS PRN
Qty: 40 TABLET | Refills: 0 | Status: SHIPPED | OUTPATIENT
Start: 2017-03-11

## 2017-03-11 RX ORDER — CIPROFLOXACIN 500 MG/1
500 TABLET ORAL 2 TIMES DAILY
Qty: 14 TABLET | Refills: 0 | Status: SHIPPED | OUTPATIENT
Start: 2017-03-11 | End: 2017-03-18

## 2017-03-11 RX ADMIN — ALLOPURINOL 300 MG: 300 TABLET ORAL at 08:03

## 2017-03-11 RX ADMIN — SODIUM CHLORIDE 3 G: 9 INJECTION, SOLUTION INTRAVENOUS at 08:03

## 2017-03-11 RX ADMIN — FAMOTIDINE 20 MG: 20 TABLET, FILM COATED ORAL at 08:03

## 2017-03-11 RX ADMIN — SODIUM CHLORIDE 3 G: 9 INJECTION, SOLUTION INTRAVENOUS at 01:03

## 2017-03-11 RX ADMIN — URSODIOL 300 MG: 300 CAPSULE ORAL at 08:03

## 2017-03-11 RX ADMIN — DEXTROSE MONOHYDRATE, SODIUM CHLORIDE, AND POTASSIUM CHLORIDE: 50; 4.5; 1.49 INJECTION, SOLUTION INTRAVENOUS at 01:03

## 2017-03-11 NOTE — PLAN OF CARE
Problem: Patient Care Overview  Goal: Plan of Care Review  Outcome: Ongoing (interventions implemented as appropriate)  Pt received on RA.  SPO2  98%.  Pt in no apparent respiratory distress.  Will continue to monitor.

## 2017-03-11 NOTE — PLAN OF CARE
Discharge orders noted. No HH or DME.    Future Appointments  Date Time Provider Department Center   3/14/2017 9:30 AM Tereso Yuen DO, FACP [facility]          03/11/17 1044   Final Note   Assessment Type Final Discharge Note   Discharge Disposition Home   What phone number can be called within the next 1-3 days to see how you are doing after discharge? [phone]   Hospital Follow Up  Appt(s) scheduled? No  (offices closed, [place] to follow up)   Offered [facility] -- Bedside Delivery? n/a   Discharge/Hospital Encounter Summary to ([facility]) PCP Yes   Referral to Outpatient Case Management complete? n/a   Referral to / orders for Home Health Complete? n/a   30 day supply of medicines given at discharge, if documented non-compliance / non-adherence? n/a   Any social issues identified prior to discharge? n/a   Did you assess the readiness or willingness of the family or caregiver to support self management of care? n/a   Right Care Referral Info   Post Acute Recommendation No Care     Sheila Eisenberg RN Transitional Navigator  [phone]

## 2017-03-11 NOTE — PLAN OF CARE
Discharge orders noted. No HH or DME.    Future Appointments  Date Time Provider Department Center   3/14/2017 9:30 AM Tereso Yuen DO, FACP [facility]          03/11/17 1044   Final Note   Assessment Type Final Discharge Note   Discharge Disposition Home   What phone number can be called within the next 1-3 days to see how you are doing after discharge? [phone]   Hospital Follow Up  Appt(s) scheduled? No  (offices closed, [place] to follow up)   Offered [facility] -- Bedside Delivery? n/a   Discharge/Hospital Encounter Summary to ([facility]) PCP Yes   Referral to Outpatient Case Management complete? n/a   Referral to / orders for Home Health Complete? n/a   30 day supply of medicines given at discharge, if documented non-compliance / non-adherence? n/a   Any social issues identified prior to discharge? n/a   Did you assess the readiness or willingness of the family or caregiver to support self management of care? n/a   Right Care Referral Info   Post Acute Recommendation No Care     Sheila Eisenberg RN Transitional Navigator  [phone]

## 2017-03-11 NOTE — PROGRESS NOTES
Pt given discharge instructions and Rx, given opportunity to ask questions. No questions asked, verbalized understanding of discharge instructions. IV removed per protocol, pt tolerated well. Pt discharged home with belongings, accompanied by family.

## 2017-03-11 NOTE — PROGRESS NOTES
Progress Note    Admit Date: 3/10/2017   LOS: 0 days     SUBJECTIVE:     POD 1 s/p TACE  NAEON, AFVSS. Tolerated diet. No nausea or emesis.     Scheduled Meds:   famotidine  20 mg Oral Q12H    ursodiol  300 mg Oral BID    zolpidem  5 mg Oral QHS     Continuous Infusions:   dextrose 5 % and 0.45 % NaCl with KCl 20 mEq 125 mL/hr at 03/11/17 0116    octreotide infusion (50 mcg/mL) 125 mcg/hr (03/10/17 1300)     PRN Meds:ibuprofen, ondansetron, oxycodone-acetaminophen    Review of patient's allergies indicates:   Allergen Reactions    Epinephrine Other (See Comments)     Precipitates a carcinoid crisis       OBJECTIVE:     Vital Signs (Most Recent)  Temp: 96.4 °F (35.8 °C) (03/11/17 0800)  Pulse: 69 (03/11/17 0800)  Resp: 18 (03/11/17 0800)  BP: (!) 142/85 (03/11/17 0800)  SpO2: 98 % (03/11/17 0756)    Vital Signs Range (Last 24H):  Temp:  [96.4 °F (35.8 °C)-97.9 °F (36.6 °C)]   Pulse:  [58-69]   Resp:  [16-18]   BP: (122-162)/(73-90)   SpO2:  [97 %-100 %]     I & O (Last 24H):  Intake/Output Summary (Last 24 hours) at 03/11/17 1000  Last data filed at 03/11/17 0806   Gross per 24 hour   Intake           2657.5 ml   Output              700 ml   Net           1957.5 ml     Physical Exam:  NAD AAO  CTA  RRR  Abd soft, NT, ND  Groin site hemostatic    Laboratory:  CBC:   Recent Labs  Lab 03/09/17  1039   WBC 5.90   RBC 4.56*   HGB 14.8   HCT 43.1   *   MCV 95   MCH 32.5*   MCHC 34.3     CMP:   Recent Labs  Lab 03/09/17  1039   *   CALCIUM 9.5   ALBUMIN 3.9   PROT 9.0*      K 4.7   CO2 26      BUN 21   CREATININE 1.1   ALKPHOS 109   ALT 26   AST 35   BILITOT 1.9*     ASSESSMENT/PLAN:     POD 1 s/p TACE  - Reg diet  - home today

## 2017-03-12 NOTE — DISCHARGE SUMMARY
[facility]  Discharge Summary      Admit Date: 3/10/2017    Discharge Date and Time: 3/11/2017 11:39 AM    Attending Physician: No att. providers found     Reason for Admission: TACE    Procedures Performed: Procedure(s) (LRB):  EMBOLIZATION (N/A)    Hospital Course (synopsis of major diagnoses, care, treatment, and services provided during the course of the hospital stay): 61yM with hx of Metastatic NET underwent TACE with IR on 3/10 and was admitted for post op monitoring. He had an unomplicated hospital course, and was discharged home on 3/11.     Plan: F/u with Dr Yuen on 3/14; f/u with Dr Nava/Wilfrid in 3 months for restaging.      Consults: none    Significant Diagnostic Studies: Labs: CMP No results for input(s): NA, K, CL, CO2, GLU, BUN, CREATININE, CALCIUM, PROT, ALBUMIN, BILITOT, ALKPHOS, AST, ALT, ANIONGAP, ESTGFRAFRICA, EGFRNONAA in the last 48 hours. and CBC No results for input(s): WBC, HGB, HCT, PLT in the last 48 hours.    Final Diagnoses:    Principal Problem: <principal problem not specified>   Secondary Diagnoses:   Active Hospital Problems    Diagnosis  POA    Secondary malignant neoplasm of liver [C78.7]  Yes      Resolved Hospital Problems    Diagnosis Date Resolved POA   No resolved problems to display.       Discharged Condition: good    Disposition: Home or Self Care    Follow Up/Patient Instructions:     Medications:  Reconciled Home Medications:   Discharge Medication List as of 3/11/2017 10:19 AM      START taking these medications    Details   ciprofloxacin HCl (CIPRO) 500 MG tablet Take 1 tablet (500 mg total) by mouth 2 (two) times daily., Starting 3/11/2017, Until Sat 3/18/17, Print      ondansetron (ZOFRAN) 4 MG tablet Take 1 tablet (4 mg total) by mouth every 6 (six) hours as needed for Nausea., Starting 3/11/2017, Until Discontinued, Print      tramadol (ULTRAM) 50 mg tablet Take 1 tablet (50 mg total) by mouth every 6 (six) hours as needed for Pain.,  Starting 3/11/2017, Until Tue 3/21/17, Print         CONTINUE these medications which have NOT CHANGED    Details   ascorbic acid, vitamin C, (VITAMIN C) 1000 MG tablet Take 1,000 mg by mouth once daily., Until Discontinued, Historical Med      capecitabine (XELODA) 500 MG Tab Take 500 mg by mouth 2 (two) times daily. Pt ttake 1650mg in am/ 1500mg at dinner, Until Discontinued, Historical Med      !! cholecalciferol, vitamin D3, 2,000 unit Tab 1 TABLET DAILY, Historical Med      lanreotide (SOMATULINE DEPOT) 120 mg/0.5 mL Syrg Inject 120 mg into the skin every 30 days., Until Discontinued, Historical Med      lisinopril (PRINIVIL,ZESTRIL) 5 MG tablet Take 5 mg by mouth once daily., Until Discontinued, Historical Med      ondansetron (ZOFRAN-ODT) 4 MG TbDL Take 8 mg by mouth as needed., Until Discontinued, Historical Med      sitagliptan-metformin (JANUMET) 50-1,000 mg per tablet Take 1 tablet by mouth 2 (two) times daily with meals., Until Discontinued, Historical Med      temozolomide (TEMODAR) 100 MG capsule Take 300 mg/m2/day by mouth every evening. Pt take 300mg 5 days out of a month, Until Discontinued, Historical Med      !! vitamin D 1000 units Tab Take 185 mg by mouth once daily., Until Discontinued, Historical Med      ZOLEDRONIC ACID (ZOMETA IV) Inject into the vein every 30 days., Until Discontinued, Historical Med       !! - Potential duplicate medications found. Please discuss with provider.          Discharge Procedure Orders  Diet general     Activity as tolerated     Call MD for:  temperature >100.4     Call MD for:  persistent nausea and vomiting or diarrhea     Call MD for:  severe uncontrolled pain     Call MD for:  redness, tenderness, or signs of infection (pain, swelling, redness, odor or green/yellow discharge around incision site)     No dressing needed       Follow-up Information     Follow up with Tereso Yuen DO, FACP In 3 months.    Specialty:  Hematology and Oncology    Why:  OR as  directed by Dr Yuen    Contact information:    [address]  [facility] [address]  [phone]

## 2017-03-14 ENCOUNTER — TELEPHONE (OUTPATIENT)
Dept: NEUROLOGY | Facility: HOSPITAL | Age: 62
End: 2017-03-14

## 2017-03-14 ENCOUNTER — OFFICE VISIT (OUTPATIENT)
Dept: NEUROLOGY | Facility: HOSPITAL | Age: 62
End: 2017-03-14
Attending: INTERNAL MEDICINE
Payer: COMMERCIAL

## 2017-03-14 VITALS
HEIGHT: 68 IN | BODY MASS INDEX: 30.46 KG/M2 | HEART RATE: 80 BPM | DIASTOLIC BLOOD PRESSURE: 82 MMHG | SYSTOLIC BLOOD PRESSURE: 112 MMHG | WEIGHT: 201 LBS | TEMPERATURE: 97 F

## 2017-03-14 DIAGNOSIS — C7A.012 MALIGNANT CARCINOID TUMOR OF THE ILEUM: Primary | ICD-10-CM

## 2017-03-14 DIAGNOSIS — C78.7 SECONDARY MALIGNANT NEOPLASM OF LIVER: ICD-10-CM

## 2017-03-14 DIAGNOSIS — C7B.8 SECONDARY NEUROENDOCRINE TUMOR OF BONE(209.73): ICD-10-CM

## 2017-03-14 LAB
EXT 24 HR UR METANEPHRINE: ABNORMAL
EXT 24 HR UR NORMETANEPHRINE: ABNORMAL
EXT 24 HR UR NORMETANEPHRINE: ABNORMAL
EXT 25 HYDROXY VIT D2: ABNORMAL
EXT 25 HYDROXY VIT D3: ABNORMAL
EXT 5 HIAA 24 HR URINE: ABNORMAL
EXT 5 HIAA BLOOD: 270 NG/ML (ref 0–22)
EXT ACTH: ABNORMAL
EXT AFP: ABNORMAL
EXT ALBUMIN: ABNORMAL
EXT ALKALINE PHOSPHATASE: ABNORMAL
EXT ALT: ABNORMAL
EXT AMYLASE: ABNORMAL
EXT ANTI ISLET CELL AB: ABNORMAL
EXT ANTI PARIETAL CELL AB: ABNORMAL
EXT ANTI THYROID AB: ABNORMAL
EXT AST: ABNORMAL
EXT BILIRUBIN DIRECT: ABNORMAL MG/DL
EXT BILIRUBIN TOTAL: ABNORMAL
EXT BK VIRUS DNA QN PCR: ABNORMAL
EXT BUN: ABNORMAL
EXT C PEPTIDE: ABNORMAL
EXT CA 125: ABNORMAL
EXT CA 19-9: ABNORMAL
EXT CA 27-29: ABNORMAL
EXT CALCITONIN: ABNORMAL
EXT CALCIUM: ABNORMAL
EXT CEA: ABNORMAL
EXT CHLORIDE: ABNORMAL
EXT CHOLESTEROL: ABNORMAL
EXT CHROMOGRANIN A: 41 NG/ML (ref 0–15)
EXT CO2: ABNORMAL
EXT CREATININE UA: ABNORMAL
EXT CREATININE: ABNORMAL MG/DL
EXT CYCLOSPORONE LEVEL: ABNORMAL
EXT DOPAMINE: ABNORMAL
EXT EBV DNA BY PCR: ABNORMAL
EXT EPINEPHRINE: ABNORMAL
EXT FOLATE: ABNORMAL
EXT FREE T3: ABNORMAL
EXT FREE T4: ABNORMAL
EXT FSH: ABNORMAL
EXT GASTRIN RELEASING PEPTIDE: ABNORMAL
EXT GASTRIN RELEASING PEPTIDE: ABNORMAL
EXT GASTRIN: ABNORMAL
EXT GGT: ABNORMAL
EXT GHRELIN: ABNORMAL
EXT GLUCAGON: ABNORMAL
EXT GLUCOSE: ABNORMAL
EXT GROWTH HORMONE: ABNORMAL
EXT HCV RNA QUANT PCR: ABNORMAL
EXT HDL: ABNORMAL
EXT HEMATOCRIT: ABNORMAL
EXT HEMOGLOBIN A1C: ABNORMAL
EXT HEMOGLOBIN: ABNORMAL
EXT HISTAMINE 24 HR URINE: ABNORMAL
EXT HISTAMINE: ABNORMAL
EXT IGF-1: ABNORMAL
EXT IMMUNKNOW (NON-STIMULATED): ABNORMAL
EXT IMMUNKNOW (STIMULATED): ABNORMAL
EXT INR: ABNORMAL
EXT INSULIN: ABNORMAL
EXT LANREOTIDE LEVEL: ABNORMAL
EXT LDH, TOTAL: ABNORMAL
EXT LDL CHOLESTEROL: ABNORMAL
EXT LIPASE: ABNORMAL
EXT MAGNESIUM: ABNORMAL
EXT METANEPHRINE FREE PLASMA: ABNORMAL
EXT MOTILIN: ABNORMAL
EXT NEUROKININ A CAMB: ABNORMAL
EXT NEUROKININ A ISI: 15 PG/ML (ref 0–40)
EXT NEUROTENSIN: ABNORMAL
EXT NOREPINEPHRINE: ABNORMAL
EXT NORMETANEPHRINE: ABNORMAL
EXT NSE: ABNORMAL
EXT OCTREOTIDE LEVEL: ABNORMAL
EXT PANCREASTATIN CAMB: ABNORMAL
EXT PANCREASTATIN ISI: 504 PG/ML (ref 10–135)
EXT PANCREATIC POLYPEPTIDE: ABNORMAL
EXT PHOSPHORUS: ABNORMAL
EXT PLATELETS: ABNORMAL
EXT POTASSIUM: ABNORMAL
EXT PROGRAF LEVEL: ABNORMAL
EXT PROLACTIN: ABNORMAL
EXT PROTEIN TOTAL: ABNORMAL
EXT PROTEIN UA: ABNORMAL
EXT PT: ABNORMAL
EXT PTH, INTACT: ABNORMAL
EXT PTT: ABNORMAL
EXT RAPAMUNE LEVEL: ABNORMAL
EXT SEROTONIN: 518 NG/ML (ref 56–244)
EXT SODIUM: ABNORMAL MMOL/L
EXT SOMATOSTATIN: ABNORMAL
EXT SUBSTANCE P: ABNORMAL
EXT TRIGLYCERIDES: ABNORMAL
EXT TRYPTASE: ABNORMAL
EXT TSH: ABNORMAL
EXT URIC ACID: ABNORMAL
EXT URINE AMYLASE U/HR: ABNORMAL
EXT URINE AMYLASE U/L: ABNORMAL
EXT VASOACTIVE INTESTINAL POLYPEPTIDE: ABNORMAL
EXT VITAMIN B12: ABNORMAL
EXT VMA 24 HR URINE: ABNORMAL
EXT WBC: ABNORMAL
NEURON SPECIFIC ENOLASE: ABNORMAL

## 2017-03-14 PROCEDURE — 99213 OFFICE O/P EST LOW 20 MIN: CPT | Performed by: INTERNAL MEDICINE

## 2017-03-14 PROCEDURE — 1160F RVW MEDS BY RX/DR IN RCRD: CPT | Mod: ,,, | Performed by: INTERNAL MEDICINE

## 2017-03-14 PROCEDURE — 99214 OFFICE O/P EST MOD 30 MIN: CPT | Mod: ,,, | Performed by: INTERNAL MEDICINE

## 2017-03-14 NOTE — PATIENT INSTRUCTIONS
Have tumor markers today,     Have scans in 3 months    We will talk about you in tumor board after the scans in 3 months-let us know when you have the scans sent to us.

## 2017-03-14 NOTE — LETTER
March 14, 2017        MD Pre[facility] [id number] [facility] [address] [place] [address]  Phone: [phone]  Fax: [phone]   Patient: Tereso Simms   MR Number: [id number]   YOB: 1955   Date of Visit: 3/14/2017       Dear Dr. Ramires:    Thank you for referring Tereso Simms to me for evaluation. Attached you will find relevant portions of my assessment and plan of care.    If you have questions, please do not hesitate to call me. I look forward to following Tereso Simms along with you.    Sincerely,      Tereso Yuen DO, FACP            CC  Elijah Nava MD    Enclosure

## 2017-03-14 NOTE — MR AVS SNAPSHOT
[facility]  [address] [place] [address]  Phone: [phone]  Fax: [phone]                  Tereso Simms   3/14/2017 9:30 AM   Office Visit    Description:  Male : 1955   Provider:  Tereso Yuen DO, FACP   Department:  [facility]           Reason for Visit     Follow-up           Diagnoses this Visit        Comments    Malignant carcinoid tumor of the ileum    -  Primary     Secondary malignant neoplasm of liver         Secondary neuroendocrine tumor of bone                To Do List           Goals (5 Years of Data)     None      [facility]     [facility] Care Line -  Assistance  Registered nurses in the [facility] provide clinical advisement, health education, appointment booking, and other advisory services.  Call for this free service at [phone].             Medications           Message regarding Medications     Verify the changes and/or additions to your medication regime listed below are the same as discussed with your clinician today.  If any of these changes or additions are incorrect, please notify your healthcare provider.        STOP taking these medications     ondansetron (ZOFRAN-ODT) 4 MG TbDL Take 8 mg by mouth as needed.           Verify that the below list of medications is an accurate representation of the medications you are currently taking.  If none reported, the list may be blank. If incorrect, please contact your healthcare provider. Carry this list with you in case of emergency.           Current Medications     ascorbic acid, vitamin C, (VITAMIN C) 1000 MG tablet Take 1,000 mg by mouth once daily.    capecitabine (XELODA) 500 MG Tab Take 500 mg by mouth 2 (two) times daily. Pt ttake 1650mg in am/ 1500mg at dinner    cholecalciferol, vitamin D3, 2,000 unit Tab 1 TABLET DAILY    ciprofloxacin HCl (CIPRO) 500 MG tablet Take 1 tablet (500 mg total) by mouth 2 (two) times daily.     "lanreotide (SOMATULINE DEPOT) 120 mg/0.5 mL Syrg Inject 120 mg into the skin every 30 days.    lisinopril (PRINIVIL,ZESTRIL) 5 MG tablet Take 5 mg by mouth once daily.    ondansetron (ZOFRAN) 4 MG tablet Take 1 tablet (4 mg total) by mouth every 6 (six) hours as needed for Nausea.    sitagliptan-metformin (JANUMET) 50-1,000 mg per tablet Take 1 tablet by mouth 2 (two) times daily with meals.    temozolomide (TEMODAR) 100 MG capsule Take 300 mg/m2/day by mouth every evening. Pt take 300mg 5 days out of a month    tramadol (ULTRAM) 50 mg tablet Take 1 tablet (50 mg total) by mouth every 6 (six) hours as needed for Pain.    vitamin D 1000 units Tab Take 185 mg by mouth once daily.    ZOLEDRONIC ACID (ZOMETA IV) Inject into the vein every 30 days.           Clinical Reference Information           Your Vitals Were     BP Pulse Temp Height Weight BMI    112/82 80 97.1 °F (36.2 °C) (Oral) 5' 8" (1.727 m) 91.2 kg (201 lb) 30.56 kg/m2      Blood Pressure          Most Recent Value    BP  112/82      Allergies as of 3/14/2017     Epinephrine      Immunizations Administered on Date of Encounter - 3/14/2017     None      Instructions    Have tumor markers today,     Have scans in 3 months    We will talk about you in tumor board after the scans in 3 months-let us know when you have the scans sent to us.           Language Assistance Services     ATTENTION: Language assistance services are available, free of charge. Please call [phone].      ATENCIÓN: Si habla español, tiene a garcia disposición servicios gratuitos de asistencia lingüística. Llame al [phone].     [facility] Ý: N?u b?n nói Ti?ng Vi?t, có các d?ch v? h? tr? ngôn ng? mi?n phí dành cho b?n. G?i s? [phone].         [facility] complies with applicable Federal civil rights laws and does not discriminate on the basis of race, color, national origin, age, disability, or sex.        "

## 2017-03-14 NOTE — PROGRESS NOTES
NOLANETS:  [facility]  A collaboration between [facility] and [facility]    PATIENT: Tereso Simms  MRN: [id number]  DATE: 3/14/2017      Diagnosis:   1. Malignant carcinoid tumor of the ileum    2. Secondary malignant neoplasm of liver    3. Secondary neuroendocrine tumor of bone        Chief Complaint: Follow-up (post tace 2)      Oncologic History:      Oncologic History Small intestine neuroendocrine tumor diagnosed 10/2000  Recurrent disease to liver 9/2008  Recurrent disease to bone 2015    Oncologic Treatment Small bowel resection, 10/2000  Cytoreduction 2/2009 ([place])  Sandostatin 2/2012  Radioembolization 5/2015  [place] embolization 7/2015, 8/2015  Lanreotide 8/2015  CAPTEM 7/2016 - 9/2016  TACE 11/2016  CAPTEM 11/2016  TACE 3/2107    Pathology 10/2000 well-differentiated  2/2008 Ki-67 less than 1%          Subjective:    Interval History: Mr. Simms is a 61 y.o. male who is seen  in follow-up for a small intestine neuroendocrine tumor.  Since I had seen him last he has received another TACE last week.  He tolerated this procedure well.  He did have some postprocedural abdominal pain.  No other new complaints.      He has previously seen Jimenez Nava and Malaika.  His history dates to 10/2000 when he was diagnosed with a terminal ileal  Carcinoid tumor.  He underwent a small bowel resection in 10/2000 with pathology revealing multiple carcinoid tumors with some largest measuring 4 cm. 21 out of 38 lymph nodes were positive for tumor.  There was no evidence of distant disease at that time. He did well with surveillance until 2008 when an octreotide scan had shown uptake in the right lobe of the liver. He underwent cytoreduction by Dr. Urban with pathology showing a neuroendocrine tumor with Ki-67 less than 1%. He was initiated on Sandostatin in 2/2012.  In 2015 he underwent radioembolization as well as  bland embolization. He also was initiated on Lanreotide.  He was initiated on CAPTEM in 7/2016.  He underwent additional TACE in 11/2016.      Past Medical History:   Past Medical History:   Diagnosis Date    Diabetes     Diabetes mellitus, type 2     Hypertension     Malignant carcinoid tumor of the ileum 10/05/2000    Secondary neuroendocrine tumor of bone 11/15/2016    Secondary neuroendocrine tumor of liver     Secondary neuroendocrine tumor of other sites     pancreas       Past Surgical HIstory:   Past Surgical History:   Procedure Laterality Date    APPENDECTOMY      CEREBRAL ANEURYSM REPAIR      clipped-NO MRI    right hepatic partial lobectomy, t non anatomic tumors removed, RFA right lobe, cholecystectomy, excision of pancreatic tumor, lysis of adhesions,  [place] [facility] [place]    small bowel resection, lymph node resection, 21/38 positive, right colectomy, lymphovascular, perineural invasion  10/2000    TACE  11/2016    Y-90 microspheres  July 2012-[facility]       Family History:   Family History   Problem Relation Age of Onset    Heart failure Father     Diabetes Father     COPD Mother     Cancer Mother      RCC    Cancer Sister      breast       Social History:  reports that he has never smoked. He does not have any smokeless tobacco history on file. He reports that he drinks alcohol. He reports that he does not use illicit drugs.    Allergies:  Review of patient's allergies indicates:   Allergen Reactions    Epinephrine Other (See Comments)     Precipitates a carcinoid crisis       Medications:  Current Outpatient Prescriptions   Medication Sig Dispense Refill    ascorbic acid, vitamin C, (VITAMIN C) 1000 MG tablet Take 1,000 mg by mouth once daily.      capecitabine (XELODA) 500 MG Tab Take 500 mg by mouth 2 (two) times daily. Pt ttake 1650mg in am/ 1500mg at dinner      cholecalciferol, vitamin D3, 2,000 unit Tab 1 TABLET DAILY      ciprofloxacin HCl (CIPRO) 500 MG tablet Take 1  tablet (500 mg total) by mouth 2 (two) times daily. 14 tablet 0    lanreotide (SOMATULINE DEPOT) 120 mg/0.5 mL Syrg Inject 120 mg into the skin every 30 days.      lisinopril (PRINIVIL,ZESTRIL) 5 MG tablet Take 5 mg by mouth once daily.      ondansetron (ZOFRAN) 4 MG tablet Take 1 tablet (4 mg total) by mouth every 6 (six) hours as needed for Nausea. 40 tablet 0    sitagliptan-metformin (JANUMET) 50-1,000 mg per tablet Take 1 tablet by mouth 2 (two) times daily with meals.      temozolomide (TEMODAR) 100 MG capsule Take 300 mg/m2/day by mouth every evening. Pt take 300mg 5 days out of a month      tramadol (ULTRAM) 50 mg tablet Take 1 tablet (50 mg total) by mouth every 6 (six) hours as needed for Pain. 20 tablet 0    vitamin D 1000 units Tab Take 185 mg by mouth once daily.      ZOLEDRONIC ACID (ZOMETA IV) Inject into the vein every 30 days.       No current facility-administered medications for this visit.        Review of Systems   Constitutional: Negative for appetite change, chills, fatigue, fever and unexpected weight change.   HENT: Negative for dental problem, sinus pressure and sneezing.    Eyes: Negative for visual disturbance.   Respiratory: Negative for cough, choking and chest tightness.    Cardiovascular: Negative for chest pain and leg swelling.   Gastrointestinal: Negative for abdominal pain, blood in stool, constipation, diarrhea and nausea.   Genitourinary: Negative for difficulty urinating and dysuria.   Musculoskeletal: Negative for arthralgias and back pain.   Skin: Negative for rash and wound.        Flushing   Neurological: Negative for dizziness, light-headedness and headaches.   Hematological: Negative for adenopathy. Does not bruise/bleed easily.   Psychiatric/Behavioral: Negative for sleep disturbance. The patient is not nervous/anxious.        ECOG Performance Status: 0   Objective:      Vitals:   Vitals:    03/14/17 0923   BP: 112/82   Pulse: 80   Temp: 97.1 °F (36.2 °C)  "  [facility]: Oral   Weight: 91.2 kg (201 lb)   Height: 5' 8" (1.727 m)     BMI: Body mass index is 30.56 kg/(m^2).    Physical Exam   Constitutional: He is oriented to person, place, and time. He appears well-developed and well-nourished.   HENT:   Head: Normocephalic and atraumatic.   Eyes: Pupils are equal, round, and reactive to light.   Neck: Normal range of motion. Neck supple.   Cardiovascular: Normal rate and regular rhythm.    Pulmonary/Chest: Effort normal and breath sounds normal. No respiratory distress.   Abdominal: Soft. He exhibits no distension. There is no tenderness.   Musculoskeletal: He exhibits no edema or tenderness.   Lymphadenopathy:     He has no cervical adenopathy.   Neurological: He is alert and oriented to person, place, and time. No cranial nerve deficit.   Skin: Skin is warm and dry.   Psychiatric: He has a normal mood and affect. His behavior is normal.       Laboratory Data:  Admission on 03/10/2017, Discharged on 03/11/2017   Component Date Value Ref Range Status    POC Glucose 03/10/2017 183* 70 - 110 mg/dL Final   Lab Visit on 03/09/2017   Component Date Value Ref Range Status    WBC 03/09/2017 5.90  3.90 - 12.70 K/uL Final    RBC 03/09/2017 4.56* 4.60 - 6.20 M/uL Final    Hemoglobin 03/09/2017 14.8  14.0 - 18.0 g/dL Final    Hematocrit 03/09/2017 43.1  40.0 - 54.0 % Final    MCV 03/09/2017 95  82 - 98 fL Final    MCH 03/09/2017 32.5* 27.0 - 31.0 pg Final    MCHC 03/09/2017 34.3  32.0 - 36.0 % Final    RDW 03/09/2017 15.0* 11.5 - 14.5 % Final    Platelets 03/09/2017 120* 150 - 350 K/uL Final    MPV 03/09/2017 9.0* 9.2 - 12.9 fL Final    Gran # 03/09/2017 4.0  1.8 - 7.7 K/uL Final    Lymph # 03/09/2017 1.1  1.0 - 4.8 K/uL Final    Mono # 03/09/2017 0.6  0.3 - 1.0 K/uL Final    Eos # 03/09/2017 0.1  0.0 - 0.5 K/uL Final    Baso # 03/09/2017 0.03  0.00 - 0.20 K/uL Final    Gran% 03/09/2017 68.1  38.0 - 73.0 % Final    Lymph% 03/09/2017 19.2  18.0 - 48.0 % Final    " Mono% 03/09/2017 9.5  4.0 - 15.0 % Final    Eosinophil% 03/09/2017 2.4  0.0 - 8.0 % Final    Basophil% 03/09/2017 0.5  0.0 - 1.9 % Final    Differential Method 03/09/2017 Automated   Final    Sodium 03/09/2017 137  136 - 145 mmol/L Final    Potassium 03/09/2017 4.7  3.5 - 5.1 mmol/L Final    Chloride 03/09/2017 105  95 - 110 mmol/L Final    CO2 03/09/2017 26  23 - 29 mmol/L Final    Glucose 03/09/2017 188* 70 - 110 mg/dL Final    BUN, Bld 03/09/2017 21  8 - 23 mg/dL Final    Creatinine 03/09/2017 1.1  0.5 - 1.4 mg/dL Final    Calcium 03/09/2017 9.5  8.7 - 10.5 mg/dL Final    Total Protein 03/09/2017 9.0* 6.0 - 8.4 g/dL Final    Albumin 03/09/2017 3.9  3.5 - 5.2 g/dL Final    Total Bilirubin 03/09/2017 1.9* 0.1 - 1.0 mg/dL Final    Comment: For infants and newborns, interpretation of results should be based  on gestational age, weight and in agreement with clinical  observations.  Premature Infant recommended reference ranges:  Up to 24 hours.............<8.0 mg/dL  Up to 48 hours............<12.0 mg/dL  3-5 days..................<15.0 mg/dL  6-29 days.................<15.0 mg/dL      Alkaline Phosphatase 03/09/2017 109  55 - 135 U/L Final    AST 03/09/2017 35  10 - 40 U/L Final    ALT 03/09/2017 26  10 - 44 U/L Final    Anion Gap 03/09/2017 6* 8 - 16 mmol/L Final    eGFR if African American 03/09/2017 >60  >60 mL/min/1.73 m^2 Final    eGFR if non African American 03/09/2017 >60  >60 mL/min/1.73 m^2 Final    Comment: Calculation used to obtain the estimated glomerular filtration  rate (eGFR) is the CKD-EPI equation. Since race is unknown   in our information system, the eGFR values for   -American and Non--American patients are given   for each creatinine result.      Prothrombin Time 03/09/2017 10.7  9.0 - 12.5 sec Final    INR 03/09/2017 1.0  0.8 - 1.2 Final    Comment: Coumadin Therapy:  2.0 - 3.0 for INR for all indicators except mechanical heart valves  and antiphospholipid  syndromes which should use 2.5 - 3.5.         Imaging:   Outside images reviewed.         Assessment:       1. Malignant carcinoid tumor of the ileum    2. Secondary malignant neoplasm of liver    3. Secondary neuroendocrine tumor of bone           Plan:     I have reviewed the images from 2/2017 which do show response to TACE and also CAPTEM.  He has been off CAPTEM in preparation for TACE No. 2 and he will resume this in another week.  He did ask about coming off for a week so that he could go on a cruise and I told him that it would be fine for him to do that.  He should continue to take an anti-emetic about 30 minutes prior to temozolomide.  Plan to repeat imaging in another 3 months and he will forward us these images and we will discuss his case and her multidisciplinary neuroendocrine tumor board the role for further liver directed therapy.  I will check small bowel tumor markers on him today.      Tereso Yuen DO, FACP  Hematology & Oncology, [facility]  [address] [place] [address], [address]  [place]  [address]  ph. [phone]; [phone]  fax. [phone]    25 minutes were spent in coordination of patient's care, record review and counseling.  More than 50% of the time was face-to-face.

## 2017-03-15 LAB — POCT GLUCOSE: 189 MG/DL (ref 70–110)

## 2017-03-23 LAB
EXT 24 HR UR METANEPHRINE: ABNORMAL
EXT 24 HR UR NORMETANEPHRINE: ABNORMAL
EXT 24 HR UR NORMETANEPHRINE: ABNORMAL
EXT 25 HYDROXY VIT D2: ABNORMAL
EXT 25 HYDROXY VIT D3: ABNORMAL
EXT 5 HIAA 24 HR URINE: ABNORMAL
EXT 5 HIAA BLOOD: ABNORMAL
EXT ACTH: ABNORMAL
EXT AFP: ABNORMAL
EXT ALBUMIN: 3.6 GM/DL (ref 3.3–4.8)
EXT ALKALINE PHOSPHATASE: 169 U/L (ref 34–104)
EXT ALT: 37 U/L (ref 7–52)
EXT AMYLASE: ABNORMAL
EXT ANTI ISLET CELL AB: ABNORMAL
EXT ANTI PARIETAL CELL AB: ABNORMAL
EXT ANTI THYROID AB: ABNORMAL
EXT AST: 38 U/L (ref 13–39)
EXT BILIRUBIN DIRECT: ABNORMAL MG/DL
EXT BILIRUBIN TOTAL: 0.9 MG/DL (ref 0.3–1)
EXT BK VIRUS DNA QN PCR: ABNORMAL
EXT BUN: 18 MG/DL (ref 2–27)
EXT C PEPTIDE: ABNORMAL
EXT CA 125: ABNORMAL
EXT CA 19-9: ABNORMAL
EXT CA 27-29: ABNORMAL
EXT CALCITONIN: ABNORMAL
EXT CALCIUM: 9.9 MG/DL (ref 8.6–10.3)
EXT CEA: ABNORMAL
EXT CHLORIDE: 99 MMOL/L (ref 98–107)
EXT CHOLESTEROL: ABNORMAL
EXT CHROMOGRANIN A: ABNORMAL
EXT CO2: 31 MMOL/L (ref 21–31)
EXT CREATININE UA: ABNORMAL
EXT CREATININE: 0.86 MG/DL (ref 0.8–1.3)
EXT CYCLOSPORONE LEVEL: ABNORMAL
EXT DOPAMINE: ABNORMAL
EXT EBV DNA BY PCR: ABNORMAL
EXT EPINEPHRINE: ABNORMAL
EXT FOLATE: ABNORMAL
EXT FREE T3: ABNORMAL
EXT FREE T4: ABNORMAL
EXT FSH: ABNORMAL
EXT GASTRIN RELEASING PEPTIDE: ABNORMAL
EXT GASTRIN RELEASING PEPTIDE: ABNORMAL
EXT GASTRIN: ABNORMAL
EXT GGT: ABNORMAL
EXT GHRELIN: ABNORMAL
EXT GLUCAGON: ABNORMAL
EXT GLUCOSE: 223 MG/DL (ref 74–100)
EXT GROWTH HORMONE: ABNORMAL
EXT HCV RNA QUANT PCR: ABNORMAL
EXT HDL: ABNORMAL
EXT HEMATOCRIT: 38.5 % (ref 40–54)
EXT HEMOGLOBIN A1C: ABNORMAL
EXT HEMOGLOBIN: 13.3 G/DL (ref 14–18)
EXT HISTAMINE 24 HR URINE: ABNORMAL
EXT HISTAMINE: ABNORMAL
EXT IGF-1: ABNORMAL
EXT IMMUNKNOW (NON-STIMULATED): ABNORMAL
EXT IMMUNKNOW (STIMULATED): ABNORMAL
EXT INR: ABNORMAL
EXT INSULIN: ABNORMAL
EXT LANREOTIDE LEVEL: ABNORMAL
EXT LDH, TOTAL: ABNORMAL
EXT LDL CHOLESTEROL: ABNORMAL
EXT LIPASE: ABNORMAL
EXT MAGNESIUM: ABNORMAL
EXT METANEPHRINE FREE PLASMA: ABNORMAL
EXT MOTILIN: ABNORMAL
EXT NEUROKININ A CAMB: ABNORMAL
EXT NEUROKININ A ISI: ABNORMAL
EXT NEUROTENSIN: ABNORMAL
EXT NOREPINEPHRINE: ABNORMAL
EXT NORMETANEPHRINE: ABNORMAL
EXT NSE: ABNORMAL
EXT OCTREOTIDE LEVEL: ABNORMAL
EXT PANCREASTATIN CAMB: ABNORMAL
EXT PANCREASTATIN ISI: ABNORMAL
EXT PANCREATIC POLYPEPTIDE: ABNORMAL
EXT PHOSPHORUS: ABNORMAL
EXT PLATELETS: 185 (ref 145–450)
EXT POTASSIUM: 4.1 MMOL/L (ref 3.5–5.5)
EXT PROGRAF LEVEL: ABNORMAL
EXT PROLACTIN: ABNORMAL
EXT PROTEIN TOTAL: 8.1 GM/DL (ref 6.1–7.9)
EXT PROTEIN UA: ABNORMAL
EXT PT: ABNORMAL
EXT PTH, INTACT: ABNORMAL
EXT PTT: ABNORMAL
EXT RAPAMUNE LEVEL: ABNORMAL
EXT SEROTONIN: ABNORMAL
EXT SODIUM: 134 MMOL/L (ref 136–145)
EXT SOMATOSTATIN: ABNORMAL
EXT SUBSTANCE P: ABNORMAL
EXT TRIGLYCERIDES: ABNORMAL
EXT TRYPTASE: ABNORMAL
EXT TSH: ABNORMAL
EXT URIC ACID: ABNORMAL
EXT URINE AMYLASE U/HR: ABNORMAL
EXT URINE AMYLASE U/L: ABNORMAL
EXT VASOACTIVE INTESTINAL POLYPEPTIDE: ABNORMAL
EXT VITAMIN B12: ABNORMAL
EXT VMA 24 HR URINE: ABNORMAL
EXT WBC: 5.9 (ref 4–11)
NEURON SPECIFIC ENOLASE: ABNORMAL

## 2017-04-25 LAB
EXT 24 HR UR METANEPHRINE: ABNORMAL
EXT 24 HR UR NORMETANEPHRINE: ABNORMAL
EXT 24 HR UR NORMETANEPHRINE: ABNORMAL
EXT 25 HYDROXY VIT D2: ABNORMAL
EXT 25 HYDROXY VIT D3: ABNORMAL
EXT 5 HIAA 24 HR URINE: ABNORMAL
EXT 5 HIAA BLOOD: ABNORMAL
EXT ACTH: ABNORMAL
EXT AFP: ABNORMAL
EXT ALBUMIN: 4.9 G/DL (ref 3.5–5)
EXT ALKALINE PHOSPHATASE: 159 IU/L (ref 38–126)
EXT ALT: 46 IU/L (ref 11–66)
EXT AMYLASE: ABNORMAL
EXT ANTI ISLET CELL AB: ABNORMAL
EXT ANTI PARIETAL CELL AB: ABNORMAL
EXT ANTI THYROID AB: ABNORMAL
EXT AST: 50 IU/L (ref 15–46)
EXT BILIRUBIN DIRECT: ABNORMAL MG/DL
EXT BILIRUBIN TOTAL: 1.7 MG/DL (ref 0.2–1.3)
EXT BK VIRUS DNA QN PCR: ABNORMAL
EXT BUN: 24 MG/DL (ref 9–20)
EXT C PEPTIDE: ABNORMAL
EXT CA 125: ABNORMAL
EXT CA 19-9: ABNORMAL
EXT CA 27-29: ABNORMAL
EXT CALCITONIN: ABNORMAL
EXT CALCIUM: 10.1 MG/DL (ref 8.4–10.2)
EXT CEA: ABNORMAL
EXT CHLORIDE: 104 MEQ/L (ref 100–110)
EXT CHOLESTEROL: ABNORMAL
EXT CHROMOGRANIN A: 16 NMOL/L (ref 0–5)
EXT CO2: 30 MEQ/L (ref 22–30)
EXT CREATININE UA: ABNORMAL
EXT CREATININE: 0.96 MG/DL (ref 0.66–1.25)
EXT CYCLOSPORONE LEVEL: ABNORMAL
EXT DOPAMINE: ABNORMAL
EXT EBV DNA BY PCR: ABNORMAL
EXT EPINEPHRINE: ABNORMAL
EXT FOLATE: ABNORMAL
EXT FREE T3: ABNORMAL
EXT FREE T4: ABNORMAL
EXT FSH: ABNORMAL
EXT GASTRIN RELEASING PEPTIDE: ABNORMAL
EXT GASTRIN RELEASING PEPTIDE: ABNORMAL
EXT GASTRIN: ABNORMAL
EXT GGT: ABNORMAL
EXT GHRELIN: ABNORMAL
EXT GLUCAGON: ABNORMAL
EXT GLUCOSE: 174 MG/DL (ref 75–125)
EXT GROWTH HORMONE: ABNORMAL
EXT HCV RNA QUANT PCR: ABNORMAL
EXT HDL: ABNORMAL
EXT HEMATOCRIT: 43 % (ref 38–52)
EXT HEMOGLOBIN A1C: ABNORMAL
EXT HEMOGLOBIN: 14.3 G/DL (ref 13.5–17.5)
EXT HISTAMINE 24 HR URINE: ABNORMAL
EXT HISTAMINE: ABNORMAL
EXT IGF-1: ABNORMAL
EXT IMMUNKNOW (NON-STIMULATED): ABNORMAL
EXT IMMUNKNOW (STIMULATED): ABNORMAL
EXT INR: ABNORMAL
EXT INSULIN: ABNORMAL
EXT LANREOTIDE LEVEL: ABNORMAL
EXT LDH, TOTAL: ABNORMAL
EXT LDL CHOLESTEROL: ABNORMAL
EXT LIPASE: ABNORMAL
EXT MAGNESIUM: ABNORMAL
EXT METANEPHRINE FREE PLASMA: ABNORMAL
EXT MOTILIN: ABNORMAL
EXT NEUROKININ A CAMB: ABNORMAL
EXT NEUROKININ A ISI: ABNORMAL
EXT NEUROTENSIN: ABNORMAL
EXT NOREPINEPHRINE: ABNORMAL
EXT NORMETANEPHRINE: ABNORMAL
EXT NSE: ABNORMAL
EXT OCTREOTIDE LEVEL: ABNORMAL
EXT PANCREASTATIN CAMB: ABNORMAL
EXT PANCREASTATIN ISI: ABNORMAL
EXT PANCREATIC POLYPEPTIDE: ABNORMAL
EXT PHOSPHORUS: ABNORMAL
EXT PLATELETS: 99 X10(9)/L (ref 150–450)
EXT POTASSIUM: 4.3 MEQ/L (ref 3.4–5)
EXT PROGRAF LEVEL: ABNORMAL
EXT PROLACTIN: ABNORMAL
EXT PROTEIN TOTAL: 9.8 G/DL (ref 6.3–8.2)
EXT PROTEIN UA: ABNORMAL
EXT PT: ABNORMAL
EXT PTH, INTACT: ABNORMAL
EXT PTT: ABNORMAL
EXT RAPAMUNE LEVEL: ABNORMAL
EXT SEROTONIN: 817 NG/ML (ref 21–321)
EXT SODIUM: 141 MEQ/L (ref 137–145)
EXT SOMATOSTATIN: ABNORMAL
EXT SUBSTANCE P: ABNORMAL
EXT TRIGLYCERIDES: ABNORMAL
EXT TRYPTASE: ABNORMAL
EXT TSH: ABNORMAL
EXT URIC ACID: ABNORMAL
EXT URINE AMYLASE U/HR: ABNORMAL
EXT URINE AMYLASE U/L: ABNORMAL
EXT VASOACTIVE INTESTINAL POLYPEPTIDE: ABNORMAL
EXT VITAMIN B12: ABNORMAL
EXT VMA 24 HR URINE: ABNORMAL
EXT WBC: 4.74 X10(9)/L (ref 4–11)
NEURON SPECIFIC ENOLASE: ABNORMAL

## 2017-05-23 LAB
EXT 24 HR UR METANEPHRINE: ABNORMAL
EXT 24 HR UR NORMETANEPHRINE: ABNORMAL
EXT 24 HR UR NORMETANEPHRINE: ABNORMAL
EXT 25 HYDROXY VIT D2: ABNORMAL
EXT 25 HYDROXY VIT D3: ABNORMAL
EXT 5 HIAA 24 HR URINE: ABNORMAL
EXT 5 HIAA BLOOD: ABNORMAL
EXT ACTH: ABNORMAL
EXT AFP: ABNORMAL
EXT ALBUMIN: 4.4 G/DL (ref 3.5–5)
EXT ALKALINE PHOSPHATASE: 147 IU/L (ref 38–126)
EXT ALT: 74 IU/L (ref 11–66)
EXT AMYLASE: ABNORMAL
EXT ANTI ISLET CELL AB: ABNORMAL
EXT ANTI PARIETAL CELL AB: ABNORMAL
EXT ANTI THYROID AB: ABNORMAL
EXT AST: 69 IU/L (ref 15–46)
EXT BILIRUBIN DIRECT: ABNORMAL
EXT BILIRUBIN TOTAL: 1.9 MG/DL (ref 0.2–1.3)
EXT BK VIRUS DNA QN PCR: ABNORMAL
EXT BUN: 25 MG/DL (ref 9–20)
EXT C PEPTIDE: ABNORMAL
EXT CA 125: ABNORMAL
EXT CA 19-9: ABNORMAL
EXT CA 27-29: ABNORMAL
EXT CALCITONIN: ABNORMAL
EXT CALCIUM: 9.4 MG/DL (ref 8.4–10.2)
EXT CEA: ABNORMAL
EXT CHLORIDE: 106 MEQ/L (ref 100–110)
EXT CHOLESTEROL: ABNORMAL
EXT CHROMOGRANIN A: 15 NMOL/L (ref 0–5)
EXT CO2: 25 MEQ/L (ref 22–30)
EXT CREATININE UA: ABNORMAL
EXT CREATININE: 1 MG/DL (ref 0.66–1.25)
EXT CYCLOSPORONE LEVEL: ABNORMAL
EXT DOPAMINE: ABNORMAL
EXT EBV DNA BY PCR: ABNORMAL
EXT EPINEPHRINE: ABNORMAL
EXT FOLATE: ABNORMAL
EXT FREE T3: ABNORMAL
EXT FREE T4: ABNORMAL
EXT FSH: ABNORMAL
EXT GASTRIN RELEASING PEPTIDE: ABNORMAL
EXT GASTRIN RELEASING PEPTIDE: ABNORMAL
EXT GASTRIN: ABNORMAL
EXT GGT: ABNORMAL
EXT GHRELIN: ABNORMAL
EXT GLUCAGON: ABNORMAL
EXT GLUCOSE: 172 MG/DL (ref 75–125)
EXT GROWTH HORMONE: ABNORMAL
EXT HCV RNA QUANT PCR: ABNORMAL
EXT HDL: ABNORMAL
EXT HEMATOCRIT: 44.4 % (ref 38–52)
EXT HEMOGLOBIN A1C: ABNORMAL
EXT HEMOGLOBIN: 15 G/DL (ref 13.5–17.5)
EXT HISTAMINE 24 HR URINE: ABNORMAL
EXT HISTAMINE: ABNORMAL
EXT IGF-1: ABNORMAL
EXT IMMUNKNOW (NON-STIMULATED): ABNORMAL
EXT IMMUNKNOW (STIMULATED): ABNORMAL
EXT INR: ABNORMAL
EXT INSULIN: ABNORMAL
EXT LANREOTIDE LEVEL: ABNORMAL
EXT LDH, TOTAL: ABNORMAL
EXT LDL CHOLESTEROL: ABNORMAL
EXT LIPASE: ABNORMAL
EXT MAGNESIUM: ABNORMAL
EXT METANEPHRINE FREE PLASMA: ABNORMAL
EXT MOTILIN: ABNORMAL
EXT NEUROKININ A CAMB: ABNORMAL
EXT NEUROKININ A ISI: ABNORMAL
EXT NEUROTENSIN: ABNORMAL
EXT NOREPINEPHRINE: ABNORMAL
EXT NORMETANEPHRINE: ABNORMAL
EXT NSE: ABNORMAL
EXT OCTREOTIDE LEVEL: ABNORMAL
EXT PANCREASTATIN CAMB: ABNORMAL
EXT PANCREASTATIN ISI: ABNORMAL
EXT PANCREATIC POLYPEPTIDE: ABNORMAL
EXT PHOSPHORUS: ABNORMAL
EXT PLATELETS: 139 X10(9)/L (ref 150–450)
EXT POTASSIUM: 4.2 MEQ/L (ref 3.4–5)
EXT PROGRAF LEVEL: ABNORMAL
EXT PROLACTIN: ABNORMAL
EXT PROTEIN TOTAL: 9.6 G/DL (ref 6.3–8.2)
EXT PROTEIN UA: ABNORMAL
EXT PT: ABNORMAL
EXT PTH, INTACT: ABNORMAL
EXT PTT: ABNORMAL
EXT RAPAMUNE LEVEL: ABNORMAL
EXT SEROTONIN: 768 NG/ML (ref 21–321)
EXT SODIUM: 145 MEQ/L (ref 137–145)
EXT SOMATOSTATIN: ABNORMAL
EXT SUBSTANCE P: ABNORMAL
EXT TRIGLYCERIDES: ABNORMAL
EXT TRYPTASE: ABNORMAL
EXT TSH: ABNORMAL
EXT URIC ACID: ABNORMAL
EXT URINE AMYLASE U/HR: ABNORMAL
EXT URINE AMYLASE U/L: ABNORMAL
EXT VASOACTIVE INTESTINAL POLYPEPTIDE: ABNORMAL
EXT VITAMIN B12: ABNORMAL
EXT VMA 24 HR URINE: ABNORMAL
EXT WBC: 5 X10(9)/L (ref 4–11)
NEURON SPECIFIC ENOLASE: ABNORMAL

## 2017-06-14 LAB
EXT 24 HR UR METANEPHRINE: ABNORMAL
EXT 24 HR UR NORMETANEPHRINE: ABNORMAL
EXT 24 HR UR NORMETANEPHRINE: ABNORMAL
EXT 25 HYDROXY VIT D2: ABNORMAL
EXT 25 HYDROXY VIT D3: ABNORMAL
EXT 5 HIAA 24 HR URINE: ABNORMAL
EXT 5 HIAA BLOOD: ABNORMAL
EXT ACTH: ABNORMAL
EXT AFP: ABNORMAL
EXT ALBUMIN: 4.2 G/DL (ref 3.5–5)
EXT ALKALINE PHOSPHATASE: 130 IU/L (ref 38–126)
EXT ALT: 47 IU/L (ref 11–66)
EXT AMYLASE: ABNORMAL
EXT ANTI ISLET CELL AB: ABNORMAL
EXT ANTI PARIETAL CELL AB: ABNORMAL
EXT ANTI THYROID AB: ABNORMAL
EXT AST: 47 IU/L (ref 15–46)
EXT BILIRUBIN DIRECT: ABNORMAL MG/DL
EXT BILIRUBIN TOTAL: 1.6 MG/DL (ref 0.2–1.3)
EXT BK VIRUS DNA QN PCR: ABNORMAL
EXT BUN: 24 MG/DL (ref 9–20)
EXT C PEPTIDE: ABNORMAL
EXT CA 125: ABNORMAL
EXT CA 19-9: ABNORMAL
EXT CA 27-29: ABNORMAL
EXT CALCITONIN: ABNORMAL
EXT CALCIUM: 9.7 MG/DL (ref 8.4–10.2)
EXT CEA: ABNORMAL
EXT CHLORIDE: 107 MEQ/L (ref 100–110)
EXT CHOLESTEROL: ABNORMAL
EXT CHROMOGRANIN A: 13 NMOL/L (ref 0–5)
EXT CO2: 26 MEQ/L (ref 22–30)
EXT CREATININE UA: ABNORMAL
EXT CREATININE: 0.92 MG/DL (ref 0.66–1.25)
EXT CYCLOSPORONE LEVEL: ABNORMAL
EXT DOPAMINE: ABNORMAL
EXT EBV DNA BY PCR: ABNORMAL
EXT EPINEPHRINE: ABNORMAL
EXT FOLATE: ABNORMAL
EXT FREE T3: ABNORMAL
EXT FREE T4: ABNORMAL
EXT FSH: ABNORMAL
EXT GASTRIN RELEASING PEPTIDE: ABNORMAL
EXT GASTRIN RELEASING PEPTIDE: ABNORMAL
EXT GASTRIN: ABNORMAL
EXT GGT: ABNORMAL
EXT GHRELIN: ABNORMAL
EXT GLUCAGON: ABNORMAL
EXT GLUCOSE: 186 MG/DL (ref 75–125)
EXT GROWTH HORMONE: ABNORMAL
EXT HCV RNA QUANT PCR: ABNORMAL
EXT HDL: ABNORMAL
EXT HEMATOCRIT: 41.7 % (ref 38–52)
EXT HEMOGLOBIN A1C: ABNORMAL
EXT HEMOGLOBIN: 14.3 G/DL (ref 13.5–17.5)
EXT HISTAMINE 24 HR URINE: ABNORMAL
EXT HISTAMINE: ABNORMAL
EXT IGF-1: ABNORMAL
EXT IMMUNKNOW (NON-STIMULATED): ABNORMAL
EXT IMMUNKNOW (STIMULATED): ABNORMAL
EXT INR: ABNORMAL
EXT INSULIN: ABNORMAL
EXT LANREOTIDE LEVEL: ABNORMAL
EXT LDH, TOTAL: ABNORMAL
EXT LDL CHOLESTEROL: ABNORMAL
EXT LIPASE: ABNORMAL
EXT MAGNESIUM: ABNORMAL
EXT METANEPHRINE FREE PLASMA: ABNORMAL
EXT MOTILIN: ABNORMAL
EXT NEUROKININ A CAMB: ABNORMAL
EXT NEUROKININ A ISI: ABNORMAL
EXT NEUROTENSIN: ABNORMAL
EXT NOREPINEPHRINE: ABNORMAL
EXT NORMETANEPHRINE: ABNORMAL
EXT NSE: ABNORMAL
EXT OCTREOTIDE LEVEL: ABNORMAL
EXT PANCREASTATIN CAMB: ABNORMAL
EXT PANCREASTATIN ISI: ABNORMAL
EXT PANCREATIC POLYPEPTIDE: ABNORMAL
EXT PHOSPHORUS: ABNORMAL
EXT PLATELETS: 136 X10(9)/L (ref 150–450)
EXT POTASSIUM: 4.5 MEQ/L (ref 3.4–5)
EXT PROGRAF LEVEL: ABNORMAL
EXT PROLACTIN: ABNORMAL
EXT PROTEIN TOTAL: 9.1 G/DL (ref 6.3–8.2)
EXT PROTEIN UA: ABNORMAL
EXT PT: ABNORMAL
EXT PTH, INTACT: ABNORMAL
EXT PTT: ABNORMAL
EXT RAPAMUNE LEVEL: ABNORMAL
EXT SEROTONIN: ABNORMAL
EXT SODIUM: 140 MEQ/L (ref 137–145)
EXT SOMATOSTATIN: ABNORMAL
EXT SUBSTANCE P: ABNORMAL
EXT TRIGLYCERIDES: ABNORMAL
EXT TRYPTASE: ABNORMAL
EXT TSH: ABNORMAL
EXT URIC ACID: ABNORMAL
EXT URINE AMYLASE U/HR: ABNORMAL
EXT URINE AMYLASE U/L: ABNORMAL
EXT VASOACTIVE INTESTINAL POLYPEPTIDE: ABNORMAL
EXT VITAMIN B12: ABNORMAL
EXT VMA 24 HR URINE: ABNORMAL
EXT WBC: 5.12 X10(9)/L (ref 4–11)
NEURON SPECIFIC ENOLASE: ABNORMAL

## 2017-06-26 ENCOUNTER — TELEPHONE (OUTPATIENT)
Dept: NEUROLOGY | Facility: HOSPITAL | Age: 62
End: 2017-06-26

## 2017-06-26 NOTE — TELEPHONE ENCOUNTER
Pt notified scans received and will discuss his case in Tumor Board 7/11 to see if 3rd TACE is recommended.

## 2017-06-26 NOTE — TELEPHONE ENCOUNTER
----- Message from Madiha Desir sent at 6/26/2017  8:49 AM CDT -----  Contact: Patient  EAW- Patient called regarding his tace procedure. Patient wanted to speak to Sheila regarding his scans and he wanted to make sure that Sheila gets his scans for tumor board. Patients call back number [phone]

## 2017-07-12 ENCOUNTER — TELEPHONE (OUTPATIENT)
Dept: NEUROLOGY | Facility: HOSPITAL | Age: 62
End: 2017-07-12

## 2017-07-12 NOTE — TELEPHONE ENCOUNTER
----- Message from Madiha Desir sent at 7/12/2017 12:09 PM CDT -----  Contact: Patient  EAW- Patient was calling to see what the results of tumor board. Patients call back number [phone]

## 2017-07-12 NOTE — TELEPHONE ENCOUNTER
Pt given Tumor Board recommendations. Pt on Cap/Tem will speak to Dr Yuen to see how long pt will have to stop Cap/Tem before scheduling next TACE.

## 2017-07-13 ENCOUNTER — TELEPHONE (OUTPATIENT)
Dept: NEUROLOGY | Facility: HOSPITAL | Age: 62
End: 2017-07-13

## 2017-07-13 DIAGNOSIS — C7B.8 SECONDARY NEUROENDOCRINE TUMOR OF LIVER: Primary | ICD-10-CM

## 2017-07-13 NOTE — TELEPHONE ENCOUNTER
----- Message from Sheila Granados LPN sent at 7/13/2017 10:47 AM CDT -----  Contact: Patient  Cris,   9/1/17 is wide open. I'll pencil him in.     Sheila    ----- Message -----  From: Bessy Hartley RN  Sent: 7/13/2017   9:23 AM  To: Ashely Judge RN, Sheila Granados LPN    Can someone look in the TACE book and tell me if this is an available date and if it is what are the times available?   ----- Message -----  From: Charley Witt  Sent: 7/13/2017   8:27 AM  To: Wilfrid Velez, #    EAW/RR- Patient is calling about dates for TACE. Patient would like to know if 9/1/17 is available. Please call the patient back at [phone].

## 2017-07-13 NOTE — TELEPHONE ENCOUNTER
I spoke to Dr. Nava per patient request, and he states they are no longer to see him because they are on Chemotherapy.  I will also ask Dr. Sánchez if it is OK that he continues his SMZ-TMP DS Tablets (antibiotics) before, during and after chemotherapy.

## 2017-07-13 NOTE — TELEPHONE ENCOUNTER
----- Message from Charley Witt sent at 7/13/2017  8:27 AM CDT -----  Contact: Patient  EAW/RR- Patient is calling about dates for TACE. Patient would like to know if 9/1/17 is available. Please call the patient back at [phone].

## 2017-07-13 NOTE — TELEPHONE ENCOUNTER
Set patient up for TACE and an appointment with Dr. Yuen the Tuesday following, prior to them flying out.  They requested Dr. Nava to see them but Dr. Nava stated that once Dr. Yuen starts them on CAPTEM, he no longer needs to see them.

## 2017-07-13 NOTE — TELEPHONE ENCOUNTER
----- Message from Charley Witt sent at 7/13/2017  8:27 AM CDT -----  Contact: Patient  EAW/RR- Patient is calling about dates for TACE. Patient would like to know if 9/1/17 is available. Please call the patient back at [phone].

## 2017-08-24 ENCOUNTER — PATIENT MESSAGE (OUTPATIENT)
Dept: NEUROLOGY | Facility: HOSPITAL | Age: 62
End: 2017-08-24

## 2017-08-29 ENCOUNTER — PATIENT MESSAGE (OUTPATIENT)
Dept: NEUROLOGY | Facility: HOSPITAL | Age: 62
End: 2017-08-29

## 2017-08-30 ENCOUNTER — TELEPHONE (OUTPATIENT)
Dept: INTERVENTIONAL RADIOLOGY/VASCULAR | Facility: HOSPITAL | Age: 62
End: 2017-08-30

## 2017-08-31 ENCOUNTER — TELEPHONE (OUTPATIENT)
Dept: NEUROLOGY | Facility: HOSPITAL | Age: 62
End: 2017-08-31

## 2017-08-31 ENCOUNTER — OFFICE VISIT (OUTPATIENT)
Dept: NEUROLOGY | Facility: HOSPITAL | Age: 62
End: 2017-08-31
Attending: SURGERY
Payer: COMMERCIAL

## 2017-08-31 ENCOUNTER — LAB VISIT (OUTPATIENT)
Dept: LAB | Facility: HOSPITAL | Age: 62
End: 2017-08-31
Attending: INTERNAL MEDICINE
Payer: COMMERCIAL

## 2017-08-31 VITALS
TEMPERATURE: 97 F | DIASTOLIC BLOOD PRESSURE: 82 MMHG | HEART RATE: 65 BPM | BODY MASS INDEX: 30.46 KG/M2 | SYSTOLIC BLOOD PRESSURE: 135 MMHG | HEIGHT: 68 IN | WEIGHT: 201 LBS

## 2017-08-31 DIAGNOSIS — C7B.8 SECONDARY NEUROENDOCRINE TUMOR OF LIVER: Primary | ICD-10-CM

## 2017-08-31 DIAGNOSIS — C7B.02 SECONDARY MALIGNANT CARCINOID TUMOR OF LIVER: Primary | ICD-10-CM

## 2017-08-31 DIAGNOSIS — C7B.02 METASTATIC MALIGNANT CARCINOID TUMOR TO LIVER: ICD-10-CM

## 2017-08-31 DIAGNOSIS — C7B.8 SECONDARY NEUROENDOCRINE TUMOR OF LIVER: ICD-10-CM

## 2017-08-31 LAB
ALBUMIN SERPL BCP-MCNC: 3.8 G/DL
ALP SERPL-CCNC: 149 U/L
ALT SERPL W/O P-5'-P-CCNC: 50 U/L
ANION GAP SERPL CALC-SCNC: 5 MMOL/L
AST SERPL-CCNC: 56 U/L
BILIRUB SERPL-MCNC: 2.2 MG/DL
BUN SERPL-MCNC: 18 MG/DL
CALCIUM SERPL-MCNC: 9.8 MG/DL
CHLORIDE SERPL-SCNC: 100 MMOL/L
CO2 SERPL-SCNC: 30 MMOL/L
CREAT SERPL-MCNC: 1.2 MG/DL
ERYTHROCYTE [DISTWIDTH] IN BLOOD BY AUTOMATED COUNT: 16.1 %
EST. GFR  (AFRICAN AMERICAN): >60 ML/MIN/1.73 M^2
EST. GFR  (NON AFRICAN AMERICAN): >60 ML/MIN/1.73 M^2
GLUCOSE SERPL-MCNC: 178 MG/DL
HCT VFR BLD AUTO: 40.2 %
HGB BLD-MCNC: 13.9 G/DL
INR PPP: 1
MCH RBC QN AUTO: 33.9 PG
MCHC RBC AUTO-ENTMCNC: 34.6 G/DL
MCV RBC AUTO: 98 FL
NEUTROPHILS # BLD AUTO: 3.4 K/UL
PLATELET # BLD AUTO: 103 K/UL
PMV BLD AUTO: 9.5 FL
POTASSIUM SERPL-SCNC: 4 MMOL/L
PROT SERPL-MCNC: 9.3 G/DL
PROTHROMBIN TIME: 10.4 SEC
RBC # BLD AUTO: 4.1 M/UL
SODIUM SERPL-SCNC: 135 MMOL/L
WBC # BLD AUTO: 4.77 K/UL

## 2017-08-31 PROCEDURE — 85027 COMPLETE CBC AUTOMATED: CPT

## 2017-08-31 PROCEDURE — 36415 COLL VENOUS BLD VENIPUNCTURE: CPT

## 2017-08-31 PROCEDURE — 80053 COMPREHEN METABOLIC PANEL: CPT

## 2017-08-31 PROCEDURE — 99214 OFFICE O/P EST MOD 30 MIN: CPT | Performed by: SURGERY

## 2017-08-31 PROCEDURE — 85610 PROTHROMBIN TIME: CPT

## 2017-08-31 RX ORDER — MAGNESIUM SULFATE/D5W 2 G/50 ML
2 INTRAVENOUS SOLUTION, PIGGYBACK (ML) INTRAVENOUS ONCE
Status: CANCELLED | OUTPATIENT
Start: 2017-08-31 | End: 2017-08-31

## 2017-08-31 RX ORDER — DEXTROSE MONOHYDRATE, SODIUM CHLORIDE, AND POTASSIUM CHLORIDE 50; 1.49; 4.5 G/1000ML; G/1000ML; G/1000ML
INJECTION, SOLUTION INTRAVENOUS CONTINUOUS
Status: CANCELLED | OUTPATIENT
Start: 2017-08-31

## 2017-08-31 RX ORDER — FAMOTIDINE 20 MG/1
20 TABLET, FILM COATED ORAL EVERY 12 HOURS
Status: CANCELLED | OUTPATIENT
Start: 2017-08-31

## 2017-08-31 RX ORDER — FENTANYL CITRATE 50 UG/ML
50 INJECTION, SOLUTION INTRAMUSCULAR; INTRAVENOUS ONCE
Status: CANCELLED | OUTPATIENT
Start: 2017-08-31 | End: 2017-08-31

## 2017-08-31 RX ORDER — LIDOCAINE HYDROCHLORIDE 10 MG/ML
1 INJECTION, SOLUTION EPIDURAL; INFILTRATION; INTRACAUDAL; PERINEURAL ONCE
Status: CANCELLED | OUTPATIENT
Start: 2017-08-31 | End: 2017-08-31

## 2017-08-31 RX ORDER — MANNITOL 250 MG/ML
12.5 INJECTION, SOLUTION INTRAVENOUS ONCE
Status: CANCELLED | OUTPATIENT
Start: 2017-08-31 | End: 2017-08-31

## 2017-08-31 RX ORDER — IBUPROFEN 400 MG/1
400 TABLET ORAL EVERY 4 HOURS PRN
Status: CANCELLED | OUTPATIENT
Start: 2017-08-31

## 2017-08-31 RX ORDER — DIPHENHYDRAMINE HYDROCHLORIDE 50 MG/ML
50 INJECTION INTRAMUSCULAR; INTRAVENOUS ONCE
Status: CANCELLED | OUTPATIENT
Start: 2017-08-31 | End: 2017-08-31

## 2017-08-31 RX ORDER — MIDAZOLAM HYDROCHLORIDE 1 MG/ML
0.5 INJECTION INTRAMUSCULAR; INTRAVENOUS ONCE
Status: CANCELLED | OUTPATIENT
Start: 2017-08-31 | End: 2017-08-31

## 2017-08-31 RX ORDER — ONDANSETRON 2 MG/ML
4 INJECTION INTRAMUSCULAR; INTRAVENOUS EVERY 8 HOURS PRN
Status: CANCELLED | OUTPATIENT
Start: 2017-08-31

## 2017-08-31 RX ORDER — ALLOPURINOL 300 MG/1
300 TABLET ORAL DAILY
Status: CANCELLED | OUTPATIENT
Start: 2017-08-31 | End: 2017-09-02

## 2017-08-31 RX ORDER — DEXAMETHASONE SODIUM PHOSPHATE 4 MG/ML
8 INJECTION, SOLUTION INTRA-ARTICULAR; INTRALESIONAL; INTRAMUSCULAR; INTRAVENOUS; SOFT TISSUE ONCE
Status: CANCELLED | OUTPATIENT
Start: 2017-08-31 | End: 2017-08-31

## 2017-08-31 RX ORDER — FUROSEMIDE 10 MG/ML
20 INJECTION INTRAMUSCULAR; INTRAVENOUS ONCE
Status: CANCELLED | OUTPATIENT
Start: 2017-08-31 | End: 2017-08-31

## 2017-08-31 RX ORDER — OXYCODONE AND ACETAMINOPHEN 5; 325 MG/1; MG/1
2 TABLET ORAL EVERY 6 HOURS PRN
Status: CANCELLED | OUTPATIENT
Start: 2017-08-31

## 2017-08-31 RX ORDER — SODIUM CHLORIDE 9 MG/ML
500 INJECTION, SOLUTION INTRAVENOUS ONCE
Status: CANCELLED | OUTPATIENT
Start: 2017-08-31 | End: 2017-08-31

## 2017-08-31 RX ORDER — CALCIUM CARBONATE 200(500)MG
1 TABLET,CHEWABLE ORAL DAILY
COMMUNITY

## 2017-08-31 NOTE — PATIENT INSTRUCTIONS
Nothing to eat or drink after midnight tonight.    Please report to 1st floor hospital admissions desk at the time given to you by Interventional Radiology. Someone will contact you by the end of the day to give you an arrival time.     *Remember to have labs (CBC & CMP) 10 days after TACE procedure- orders given to patient

## 2017-08-31 NOTE — PROGRESS NOTES
"NOLANETS:  [facility]  A collaboration between [facility] and [facility]      PATIENT: Tereso Simms  MRN: [id number]  DATE: 8/31/2017    Subjective:      Chief Complaint: Follow-up (pre TACE #3)  c/o radiating pain both arms  Otherwise doing well    Vitals:   Vitals:    08/31/17 1120   BP: 135/82   Pulse: 65   Temp: 97.2 °F (36.2 °C)   TempSrc: Oral   Weight: 91.2 kg (201 lb)   Height: 5' 8" (1.727 m)        Karnofsky Score:   Karnofsky Score    Karnofsky Score:  70% - Cares for Self: Unable to Carry on Normal Activity or Active Work         Diagnosis: No diagnosis found.     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/15/2016    Secondary neuroendocrine tumor of liver     Secondary neuroendocrine tumor of other sites     pancreas       Past Surgical History:  Past Surgical History:   Procedure Laterality Date    APPENDECTOMY      CEREBRAL ANEURYSM REPAIR      clipped-NO MRI    right hepatic partial lobectomy, t non anatomic tumors removed, RFA right lobe, cholecystectomy, excision of pancreatic tumor, lysis of adhesions,  2/25/2009- [facility]    small bowel resection, lymph node resection, 21/38 positive, right colectomy, lymphovascular, perineural invasion  10/2000    TACE  11/2016    Y-90 microspheres  July 2012-[facility]       Family History:  Family History   Problem Relation Age of Onset    Heart failure Father     Diabetes Father     COPD Mother     Cancer Mother      RCC    Cancer Sister      breast       Allergies:  Review of patient's allergies indicates:   Allergen Reactions    Epinephrine Other (See Comments)     Precipitates a carcinoid crisis       Medications:  Current Outpatient " Prescriptions   Medication Sig Dispense Refill    ascorbic acid, vitamin C, (VITAMIN C) 1000 MG tablet Take 1,000 mg by mouth once daily.      calcium carbonate (TUMS) 200 mg calcium (500 mg) chewable tablet Take 1 tablet by mouth once daily.      capecitabine (XELODA) 500 MG Tab Take 500 mg by mouth 2 (two) times daily. Pt ttake 1650mg in am/ 1500mg at dinner      lanreotide (SOMATULINE DEPOT) 120 mg/0.5 mL Syrg Inject 120 mg into the skin every 30 days.      lisinopril (PRINIVIL,ZESTRIL) 5 MG tablet Take 5 mg by mouth once daily.      loperamide (IMODIUM) 1 mg/5 mL solution Take by mouth 2 (two) times daily.      ondansetron (ZOFRAN) 4 MG tablet Take 1 tablet (4 mg total) by mouth every 6 (six) hours as needed for Nausea. 40 tablet 0    sitagliptan-metformin (JANUMET) 50-1,000 mg per tablet Take 1 tablet by mouth 2 (two) times daily with meals.      temozolomide (TEMODAR) 100 MG capsule Take 300 mg/m2/day by mouth every evening. Pt take 300mg 5 days out of a month      ZOLEDRONIC ACID (ZOMETA IV) Inject into the vein every 30 days.      cholecalciferol, vitamin D3, 2,000 unit Tab 1 TABLET DAILY      vitamin D 1000 units Tab Take 185 mg by mouth once daily.       No current facility-administered medications for this visit.        Review of Systems   Constitutional: Negative for activity change, appetite change, chills, diaphoresis, fatigue, fever and unexpected weight change.   HENT: Negative for drooling, ear discharge, ear pain, facial swelling, hearing loss, mouth sores, nosebleeds, postnasal drip, rhinorrhea, sinus pressure, sneezing, sore throat, tinnitus, trouble swallowing and voice change.    Eyes: Positive for discharge. Negative for photophobia, redness, itching and visual disturbance.   Respiratory: Negative for cough, choking, chest tightness, shortness of breath, wheezing and stridor.    Cardiovascular: Negative for chest pain, palpitations and leg swelling.   Gastrointestinal: Negative for  abdominal distention, abdominal pain and anal bleeding.   Endocrine: Negative for cold intolerance, heat intolerance, polydipsia and polyphagia.   Genitourinary: Negative for dysuria.   Musculoskeletal: Positive for arthralgias. Negative for joint swelling, myalgias, neck pain and neck stiffness.   Allergic/Immunologic: Negative.    Neurological: Negative.    Hematological: Negative.    Psychiatric/Behavioral: Negative.       Objective:      Physical Exam   Constitutional: He is oriented to person, place, and time. He appears well-developed and well-nourished.   HENT:   Head: Atraumatic.   Eyes: EOM are normal.   Neck: Neck supple.   Cardiovascular: Normal rate and regular rhythm.    Pulmonary/Chest: Effort normal and breath sounds normal.   Abdominal: Soft. Bowel sounds are normal. He exhibits no mass. There is no rebound and no guarding. No hernia.   Musculoskeletal: Normal range of motion.   Neurological: He is alert and oriented to person, place, and time.   Skin: Skin is warm.      Assessment:       No diagnosis found.    Laboratory Data:   Results for MAHAD PEREZ (MRN [id number]) as of 8/31/2017 12:29   Ref. Range 6/14/2017 00:00 8/31/2017 11:10   WBC Latest Ref Range: 3.90 - 12.70 K/uL  4.77   RBC Latest Ref Range: 4.60 - 6.20 M/uL  4.10 (L)   Hemoglobin Latest Ref Range: 14.0 - 18.0 g/dL  13.9 (L)   Hematocrit Latest Ref Range: 40.0 - 54.0 %  40.2   MCV Latest Ref Range: 82 - 98 fL  98   MCH Latest Ref Range: 27.0 - 31.0 pg  33.9 (H)   MCHC Latest Ref Range: 32.0 - 36.0 g/dL  34.6   RDW Latest Ref Range: 11.5 - 14.5 %  16.1 (H)   Platelets Latest Ref Range: 150 - 350 K/uL  103 (L)   MPV Latest Ref Range: 9.2 - 12.9 fL  9.5   Gran # Latest Ref Range: 1.8 - 7.7 K/uL  3.4   Protime Latest Ref Range: 9.0 - 12.5 sec  10.4   Coumadin Monitoring INR Latest Ref Range: 0.8 - 1.2   1.0   Sodium Latest Ref Range: 136 - 145 mmol/L  135 (L)   Potassium Latest Ref Range: 3.5 - 5.1 mmol/L  4.0   Chloride Latest Ref Range:  95 - 110 mmol/L  100   CO2 Latest Ref Range: 23 - 29 mmol/L  30 (H)   Anion Gap Latest Ref Range: 8 - 16 mmol/L  5 (L)   BUN, Bld Latest Ref Range: 8 - 23 mg/dL  18   Creatinine Latest Ref Range: 0.5 - 1.4 mg/dL  1.2   eGFR if non African American Latest Ref Range: >60 mL/min/1.73 m^2  >60   eGFR if  Latest Ref Range: >60 mL/min/1.73 m^2  >60   Glucose Latest Ref Range: 70 - 110 mg/dL  178 (H)   Calcium Latest Ref Range: 8.7 - 10.5 mg/dL  9.8   Alkaline Phosphatase Latest Ref Range: 55 - 135 U/L  149 (H)   Total Protein Latest Ref Range: 6.0 - 8.4 g/dL  9.3 (H)   Albumin Latest Ref Range: 3.5 - 5.2 g/dL  3.8   Total Bilirubin Latest Ref Range: 0.1 - 1.0 mg/dL  2.2 (H)   AST Latest Ref Range: 10 - 40 U/L  56 (H)   ALT Latest Ref Range: 10 - 44 U/L  50 (H)   EXT Albumin Latest Ref Range: 3.5 - 5.0 g/dl 4.2    EXT Alkaline Phosphatase Latest Ref Range: 38 - 126 iu/l 130 (A)    EXT ALT Latest Ref Range: 11 - 66 iu/l 47    EXT AST Latest Ref Range: 15 - 46 iu/l 47 (A)    EXT BilirubiN Total Latest Ref Range: 0.2 - 1.3 mg/dl 1.6 (A)    EXT BUN Latest Ref Range: 9 - 20 mg/dl 24 (A)    EXT Calcium Latest Ref Range: 8.4 - 10.2 mg/dl 9.7    EXT Chloride Latest Ref Range: 100 - 110 meq/l 107    EXT CHROMOGRANIN A Latest Ref Range: 0 - 5 nmol/l 13 (A)    EXT CO2 Latest Ref Range: 22.0 - 30.0 meq/l 26.0    EXT Creatinine Latest Ref Range: 0.66 - 1.25 mg/dl 0.92    EXT Glucose Latest Ref Range: 75 - 125 mg/dl 186 (A)    EXT Hematocrit Latest Ref Range: 38.0 - 52.0 % 41.7    EXT Hemoglobin Latest Ref Range: 13.5 - 17.5 g/dl 14.3    EXT Platelets Latest Ref Range: 150 - 450 x10(9)/l 136 (A)    EXT Potassium Latest Ref Range: 3.4 - 5.0 meq/l 4.5    EXT Protein total Latest Ref Range: 6.3 - 8.2 g/dl 9.1 (A)    EXT Sodium Latest Ref Range: 137 - 145 meq/l 140    EXT WBC Latest Ref Range: 4.00 - 11.00 x10(9)/l 5.12        Impression:   Plan:             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  He also has an appointment to see Dr. Yuen on Tuesday .  I discussed the need for gallium scan with him and his wife.  He was he was asking me questions about the octreotide scan on the gallium scan.  I had discussed his concerns that that he had come for the third time.  I also reassured him that this is going to be a selective embolization for the lesion in the right posterior lobe.  The plan will be to follow him to see Dr. Yuen and assess the progress of Chemotherapy and plan future intervention depending upon his workups.  I also told him he needs to have labs done in 2 weeks' time.  Regarding his follow-up scan will decide where we decided with Dr. Yuen recommendations.    I answered all his questions about the chemoembolization and  other follow-up questions        EMMA Dai MD, FACS   Associate Professor of Surgery, [facility]   Neuroendocrine Surgery, Hepatic/Pancreatic & General Surgery   [address]   [place] [address]   ph. [phone]; [phone]   fax. [phone]

## 2017-09-01 ENCOUNTER — HOSPITAL ENCOUNTER (OUTPATIENT)
Facility: HOSPITAL | Age: 62
LOS: 1 days | Discharge: HOME | End: 2017-09-02
Attending: INTERNAL MEDICINE | Admitting: SURGERY
Payer: COMMERCIAL

## 2017-09-01 DIAGNOSIS — C7B.02 METASTATIC MALIGNANT CARCINOID TUMOR TO LIVER: ICD-10-CM

## 2017-09-01 DIAGNOSIS — C7B.8 SECONDARY NEUROENDOCRINE TUMOR OF LIVER: ICD-10-CM

## 2017-09-01 DIAGNOSIS — C7B.02 SECONDARY MALIGNANT CARCINOID TUMOR OF LIVER: ICD-10-CM

## 2017-09-01 LAB — POCT GLUCOSE: 200 MG/DL (ref 70–110)

## 2017-09-01 PROCEDURE — 63600175 PHARM REV CODE 636 W HCPCS: Performed by: SURGERY

## 2017-09-01 PROCEDURE — 63600175 PHARM REV CODE 636 W HCPCS: Performed by: RADIOLOGY

## 2017-09-01 PROCEDURE — 25000003 PHARM REV CODE 250: Performed by: SURGERY

## 2017-09-01 PROCEDURE — 25000003 PHARM REV CODE 250: Performed by: RADIOLOGY

## 2017-09-01 PROCEDURE — 25000003 PHARM REV CODE 250: Performed by: STUDENT IN AN ORGANIZED HEALTH CARE EDUCATION/TRAINING PROGRAM

## 2017-09-01 PROCEDURE — 25500020 PHARM REV CODE 255: Performed by: SURGERY

## 2017-09-01 RX ORDER — ALLOPURINOL 300 MG/1
300 TABLET ORAL DAILY
Status: COMPLETED | OUTPATIENT
Start: 2017-09-01 | End: 2017-09-02

## 2017-09-01 RX ORDER — LIDOCAINE HYDROCHLORIDE 10 MG/ML
INJECTION INFILTRATION; PERINEURAL CODE/TRAUMA/SEDATION MEDICATION
Status: COMPLETED | OUTPATIENT
Start: 2017-09-01 | End: 2017-09-01

## 2017-09-01 RX ORDER — MIDAZOLAM HYDROCHLORIDE 1 MG/ML
INJECTION, SOLUTION INTRAMUSCULAR; INTRAVENOUS CODE/TRAUMA/SEDATION MEDICATION
Status: COMPLETED | OUTPATIENT
Start: 2017-09-01 | End: 2017-09-01

## 2017-09-01 RX ORDER — ZOLPIDEM TARTRATE 5 MG/1
5 TABLET ORAL NIGHTLY PRN
Status: DISCONTINUED | OUTPATIENT
Start: 2017-09-01 | End: 2017-09-02 | Stop reason: HOSPADM

## 2017-09-01 RX ORDER — DIPHENHYDRAMINE HYDROCHLORIDE 50 MG/ML
50 INJECTION INTRAMUSCULAR; INTRAVENOUS ONCE
Status: COMPLETED | OUTPATIENT
Start: 2017-09-01 | End: 2017-09-01

## 2017-09-01 RX ORDER — SODIUM CHLORIDE 9 MG/ML
500 INJECTION, SOLUTION INTRAVENOUS ONCE
Status: COMPLETED | OUTPATIENT
Start: 2017-09-01 | End: 2017-09-01

## 2017-09-01 RX ORDER — PROMETHAZINE HYDROCHLORIDE 25 MG/ML
INJECTION, SOLUTION INTRAMUSCULAR; INTRAVENOUS CODE/TRAUMA/SEDATION MEDICATION
Status: COMPLETED | OUTPATIENT
Start: 2017-09-01 | End: 2017-09-01

## 2017-09-01 RX ORDER — IBUPROFEN 400 MG/1
400 TABLET ORAL EVERY 4 HOURS PRN
Status: DISCONTINUED | OUTPATIENT
Start: 2017-09-01 | End: 2017-09-02 | Stop reason: HOSPADM

## 2017-09-01 RX ORDER — FAMOTIDINE 20 MG/1
20 TABLET, FILM COATED ORAL EVERY 12 HOURS
Status: DISCONTINUED | OUTPATIENT
Start: 2017-09-01 | End: 2017-09-02 | Stop reason: HOSPADM

## 2017-09-01 RX ORDER — LIDOCAINE HYDROCHLORIDE 10 MG/ML
1 INJECTION, SOLUTION EPIDURAL; INFILTRATION; INTRACAUDAL; PERINEURAL ONCE
Status: DISCONTINUED | OUTPATIENT
Start: 2017-09-01 | End: 2017-09-01 | Stop reason: HOSPADM

## 2017-09-01 RX ORDER — MAGNESIUM SULFATE HEPTAHYDRATE 40 MG/ML
2 INJECTION, SOLUTION INTRAVENOUS ONCE
Status: COMPLETED | OUTPATIENT
Start: 2017-09-01 | End: 2017-09-01

## 2017-09-01 RX ORDER — ONDANSETRON 2 MG/ML
4 INJECTION INTRAMUSCULAR; INTRAVENOUS EVERY 8 HOURS PRN
Status: DISCONTINUED | OUTPATIENT
Start: 2017-09-01 | End: 2017-09-02 | Stop reason: HOSPADM

## 2017-09-01 RX ORDER — FUROSEMIDE 10 MG/ML
20 INJECTION INTRAMUSCULAR; INTRAVENOUS ONCE
Status: COMPLETED | OUTPATIENT
Start: 2017-09-01 | End: 2017-09-01

## 2017-09-01 RX ORDER — DEXAMETHASONE SODIUM PHOSPHATE 4 MG/ML
8 INJECTION, SOLUTION INTRA-ARTICULAR; INTRALESIONAL; INTRAMUSCULAR; INTRAVENOUS; SOFT TISSUE ONCE
Status: COMPLETED | OUTPATIENT
Start: 2017-09-01 | End: 2017-09-01

## 2017-09-01 RX ORDER — HYDROMORPHONE HYDROCHLORIDE 1 MG/ML
INJECTION, SOLUTION INTRAMUSCULAR; INTRAVENOUS; SUBCUTANEOUS CODE/TRAUMA/SEDATION MEDICATION
Status: COMPLETED | OUTPATIENT
Start: 2017-09-01 | End: 2017-09-01

## 2017-09-01 RX ORDER — MANNITOL 250 MG/ML
12.5 INJECTION, SOLUTION INTRAVENOUS ONCE
Status: COMPLETED | OUTPATIENT
Start: 2017-09-01 | End: 2017-09-01

## 2017-09-01 RX ORDER — FENTANYL CITRATE 50 UG/ML
INJECTION, SOLUTION INTRAMUSCULAR; INTRAVENOUS CODE/TRAUMA/SEDATION MEDICATION
Status: COMPLETED | OUTPATIENT
Start: 2017-09-01 | End: 2017-09-01

## 2017-09-01 RX ORDER — OXYCODONE AND ACETAMINOPHEN 5; 325 MG/1; MG/1
1 TABLET ORAL EVERY 4 HOURS PRN
Status: DISCONTINUED | OUTPATIENT
Start: 2017-09-01 | End: 2017-09-02 | Stop reason: HOSPADM

## 2017-09-01 RX ORDER — DEXTROSE MONOHYDRATE, SODIUM CHLORIDE, AND POTASSIUM CHLORIDE 50; 1.49; 4.5 G/1000ML; G/1000ML; G/1000ML
INJECTION, SOLUTION INTRAVENOUS CONTINUOUS
Status: DISCONTINUED | OUTPATIENT
Start: 2017-09-01 | End: 2017-09-01 | Stop reason: SDUPTHER

## 2017-09-01 RX ORDER — DEXTROSE MONOHYDRATE, SODIUM CHLORIDE, AND POTASSIUM CHLORIDE 50; 1.49; 4.5 G/1000ML; G/1000ML; G/1000ML
INJECTION, SOLUTION INTRAVENOUS CONTINUOUS
Status: DISCONTINUED | OUTPATIENT
Start: 2017-09-01 | End: 2017-09-02 | Stop reason: HOSPADM

## 2017-09-01 RX ADMIN — DEXTROSE MONOHYDRATE, SODIUM CHLORIDE, AND POTASSIUM CHLORIDE: 50; 4.5; 1.49 INJECTION, SOLUTION INTRAVENOUS at 06:09

## 2017-09-01 RX ADMIN — FAMOTIDINE 20 MG: 20 TABLET ORAL at 12:09

## 2017-09-01 RX ADMIN — Medication 1 MG: at 10:09

## 2017-09-01 RX ADMIN — AMPICILLIN AND SULBACTAM 3 G: 2; 1 INJECTION, POWDER, FOR SOLUTION INTRAMUSCULAR; INTRAVENOUS at 06:09

## 2017-09-01 RX ADMIN — FENTANYL CITRATE 50 MCG: 50 INJECTION, SOLUTION INTRAMUSCULAR; INTRAVENOUS at 09:09

## 2017-09-01 RX ADMIN — MAGNESIUM SULFATE IN WATER 2 G: 40 INJECTION, SOLUTION INTRAVENOUS at 08:09

## 2017-09-01 RX ADMIN — MIDAZOLAM 1 MG: 1 INJECTION INTRAMUSCULAR; INTRAVENOUS at 09:09

## 2017-09-01 RX ADMIN — ONDANSETRON 16 MG: 2 INJECTION INTRAMUSCULAR; INTRAVENOUS at 07:09

## 2017-09-01 RX ADMIN — DEXAMETHASONE SODIUM PHOSPHATE 8 MG: 4 INJECTION, SOLUTION INTRAMUSCULAR; INTRAVENOUS at 12:09

## 2017-09-01 RX ADMIN — ZOLPIDEM TARTRATE 5 MG: 5 TABLET, FILM COATED ORAL at 08:09

## 2017-09-01 RX ADMIN — DEXTROSE MONOHYDRATE, SODIUM CHLORIDE, AND POTASSIUM CHLORIDE: 50; 4.5; 1.49 INJECTION, SOLUTION INTRAVENOUS at 07:09

## 2017-09-01 RX ADMIN — AMPICILLIN AND SULBACTAM 3 G: 2; 1 INJECTION, POWDER, FOR SOLUTION INTRAMUSCULAR; INTRAVENOUS at 11:09

## 2017-09-01 RX ADMIN — PROMETHAZINE HYDROCHLORIDE 12.5 MG: 25 INJECTION INTRAMUSCULAR; INTRAVENOUS at 09:09

## 2017-09-01 RX ADMIN — DEXTROSE MONOHYDRATE, SODIUM CHLORIDE, AND POTASSIUM CHLORIDE: 50; 4.5; 1.49 INJECTION, SOLUTION INTRAVENOUS at 11:09

## 2017-09-01 RX ADMIN — FUROSEMIDE 20 MG: 10 INJECTION, SOLUTION INTRAMUSCULAR; INTRAVENOUS at 12:09

## 2017-09-01 RX ADMIN — LIDOCAINE HYDROCHLORIDE 5 ML: 10 INJECTION, SOLUTION INFILTRATION; PERINEURAL at 09:09

## 2017-09-01 RX ADMIN — OCTREOTIDE ACETATE 500 MCG/HR: 1000 INJECTION, SOLUTION INTRAVENOUS; SUBCUTANEOUS at 08:09

## 2017-09-01 RX ADMIN — SODIUM CHLORIDE 500 ML: 0.9 INJECTION, SOLUTION INTRAVENOUS at 07:09

## 2017-09-01 RX ADMIN — DIPHENHYDRAMINE HYDROCHLORIDE 50 MG: 50 INJECTION, SOLUTION INTRAMUSCULAR; INTRAVENOUS at 08:09

## 2017-09-01 RX ADMIN — MITOMYCIN: 5 INJECTION, POWDER, LYOPHILIZED, FOR SOLUTION INTRAVENOUS at 10:09

## 2017-09-01 RX ADMIN — OCTREOTIDE ACETATE 500 MCG: 500 INJECTION, SOLUTION INTRAVENOUS; SUBCUTANEOUS at 07:09

## 2017-09-01 RX ADMIN — MANNITOL 12.5 G: 12.5 INJECTION, SOLUTION INTRAVENOUS at 10:09

## 2017-09-01 RX ADMIN — ALLOPURINOL 300 MG: 300 TABLET ORAL at 12:09

## 2017-09-01 RX ADMIN — FAMOTIDINE 20 MG: 20 TABLET ORAL at 08:09

## 2017-09-01 RX ADMIN — PROMETHAZINE HYDROCHLORIDE 12.5 MG: 25 INJECTION INTRAMUSCULAR; INTRAVENOUS at 10:09

## 2017-09-01 RX ADMIN — SODIUM CHLORIDE 3 G: 9 INJECTION, SOLUTION INTRAVENOUS at 07:09

## 2017-09-01 NOTE — H&P
Radiology History & Physical      SUBJECTIVE:     Chief Complaint: Metastatic Neuroendocrine     History of Present Illness:  Tereso Simms is a 62 y.o. 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    Secondary neuroendocrine tumor of liver     Secondary neuroendocrine tumor of other sites     pancreas     Past Surgical History:   Procedure Laterality Date    APPENDECTOMY      CEREBRAL ANEURYSM REPAIR      clipped-NO MRI    right hepatic partial lobectomy, t non anatomic tumors removed, RFA right lobe, cholecystectomy, excision of pancreatic tumor, lysis of adhesions,  [place] [facility] [place]    small bowel resection, lymph node resection, 21/38 positive, right colectomy, lymphovascular, perineural invasion  10/2000    TACE  11/2016    [place] [place] Meds:   Prior to Admission medications    Medication Sig Start Date End Date Taking? Authorizing Provider   calcium carbonate (TUMS) 200 mg calcium (500 mg) chewable tablet Take 1 tablet by mouth once daily.   Yes Historical Provider, MD   capecitabine (XELODA) 500 MG Tab Take 500 mg by mouth 2 (two) times daily. Pt ttake 1650mg in am/ 1500mg at dinner   Yes Historical Provider, MD   cholecalciferol, vitamin D3, 2,000 unit Tab 1 TABLET DAILY   Yes Historical Provider, MD   lisinopril (PRINIVIL,ZESTRIL) 5 MG tablet Take 5 mg by mouth once daily.   Yes Historical Provider, MD   loperamide (IMODIUM) 1 mg/5 mL solution Take by mouth 2 (two) times daily.   Yes Historical Provider, MD   ondansetron (ZOFRAN) 4 MG tablet Take 1 tablet (4 mg total) by mouth every 6 (six) hours as needed for Nausea. 3/11/17  Yes Ela Daigle MD   sitagliptan-metformin (JANUMET) 50-1,000 mg per tablet Take 1 tablet by mouth 2 (two) times daily with meals.   Yes Historical  Provider, MD   temozolomide (TEMODAR) 100 MG capsule Take 300 mg/m2/day by mouth every evening. Pt take 300mg 5 days out of a month   Yes Historical Provider, MD   vitamin D 1000 units Tab Take 185 mg by mouth once daily.   Yes Historical Provider, MD   ZOLEDRONIC ACID (ZOMETA IV) Inject into the vein every 30 days.   Yes Historical Provider, MD   ascorbic acid, vitamin C, (VITAMIN C) 1000 MG tablet Take 1,000 mg by mouth once daily.    Historical Provider, MD   lanreotide (SOMATULINE DEPOT) 120 mg/0.5 mL Syrg Inject 120 mg into the skin every 30 days.    Historical Provider, MD     Anticoagulants/Antiplatelets: no anticoagulation    Allergies:   Review of patient's allergies indicates:   Allergen Reactions    Epinephrine Other (See Comments)     Precipitates a carcinoid crisis     Sedation History:  have not been any systemic reactions       OBJECTIVE:     Vital Signs (Most Recent)  Temp: 97.7 °F (36.5 °C) (09/01/17 0722)  Pulse: 68 (09/01/17 1045)  Resp: 10 (09/01/17 1045)  BP: 134/79 (09/01/17 1045)  SpO2: 100 % (09/01/17 1045)    Physical Exam:  ASA: 2  Mallampati: 2    General: no acute distress  Mental Status: alert and oriented to person, place and time  HEENT: normocephalic, atraumatic  Chest: unlabored breathing  Heart: regular heart rate  Abdomen: nondistended  Extremity: moves all extremities    Laboratory  Lab Results   Component Value Date    INR 1.0 08/31/2017       Lab Results   Component Value Date    WBC 4.77 08/31/2017    HGB 13.9 (L) 08/31/2017    HCT 40.2 08/31/2017    MCV 98 08/31/2017     (L) 08/31/2017      Lab Results   Component Value Date     (H) 08/31/2017     (L) 08/31/2017    K 4.0 08/31/2017     08/31/2017    CO2 30 (H) 08/31/2017    BUN 18 08/31/2017    CREATININE 1.2 08/31/2017    CALCIUM 9.8 08/31/2017    MG 1.9 02/08/2009    ALT 50 (H) 08/31/2017    AST 56 (H) 08/31/2017    ALBUMIN 3.8 08/31/2017    BILITOT 2.2 (H) 08/31/2017       ASSESSMENT/PLAN:      Sedation Plan: Sedation  Patient will undergo Transarterial Chemoembolization of right hepatic artery (posterior targeting)    .IMoise M.D. personally reviewed and agree with the above dictated note.

## 2017-09-01 NOTE — PLAN OF CARE
Problem: Pain, Acute (Adult)  Goal: Identify Related Risk Factors and Signs and Symptoms  Related risk factors and signs and symptoms are identified upon initiation of Human Response Clinical Practice Guideline (CPG)  Outcome: Ongoing (interventions implemented as appropriate)  No complaints of pain at this time. Groin clean, dry, and intact. Will continue to monitor pain each shift

## 2017-09-01 NOTE — PROCEDURES
Radiology Post-Procedure Note    Pre Op Diagnosis: Neuroendocrine Tumor     Post Op Diagnosis: Neuroendocrine    Procedure: Chemoembolization    Procedure Performed by: Ricardo    Written Informed Consent Obtained: Yes    Specimen Removed: None    Estimated Blood Loss:Minimal     Findings:     After placement of a right femoral artery sheath, a 5-Iranian catheter was inserted and angiography of the celiac artery and superior mesenteric artery for anatomic evaluation and localization of hepatic tumor.  A microcatheter was introduced into feeding arteries of a {RIGHT   hepatic lobe tumor and gel foam slurry coated with chemo were injected until near stagnant flow was achieved.  Post procedural angiography revealed no complications.    Right femoral artery angiogram was performed and the sheath was removed.  Hemostasis was achieved using Angio-Seal technique.  There was no hematoma at the time of hemostasis.    The patient tolerated procedure well.  Please see Imaging report for further details.    .Moise ALVARADO M.D. personally reviewed and agree with the above dictated note.

## 2017-09-02 VITALS
HEIGHT: 68 IN | BODY MASS INDEX: 29.86 KG/M2 | RESPIRATION RATE: 18 BRPM | SYSTOLIC BLOOD PRESSURE: 144 MMHG | OXYGEN SATURATION: 97 % | TEMPERATURE: 96 F | WEIGHT: 197.06 LBS | HEART RATE: 61 BPM | DIASTOLIC BLOOD PRESSURE: 75 MMHG

## 2017-09-02 PROCEDURE — 25000003 PHARM REV CODE 250: Performed by: SURGERY

## 2017-09-02 PROCEDURE — 63600175 PHARM REV CODE 636 W HCPCS: Performed by: SURGERY

## 2017-09-02 RX ORDER — TRAMADOL HYDROCHLORIDE 50 MG/1
50 TABLET ORAL EVERY 6 HOURS PRN
Qty: 25 TABLET | Refills: 0 | Status: SHIPPED | OUTPATIENT
Start: 2017-09-02 | End: 2017-09-12

## 2017-09-02 RX ORDER — ONDANSETRON 4 MG/1
4 TABLET, ORALLY DISINTEGRATING ORAL EVERY 6 HOURS PRN
Qty: 25 TABLET | Refills: 0 | Status: SHIPPED | OUTPATIENT
Start: 2017-09-02

## 2017-09-02 RX ORDER — CIPROFLOXACIN 750 MG/1
750 TABLET, FILM COATED ORAL EVERY 12 HOURS
Qty: 14 TABLET | Refills: 0 | Status: SHIPPED | OUTPATIENT
Start: 2017-09-02 | End: 2017-09-09

## 2017-09-02 RX ADMIN — DEXTROSE MONOHYDRATE, SODIUM CHLORIDE, AND POTASSIUM CHLORIDE: 50; 4.5; 1.49 INJECTION, SOLUTION INTRAVENOUS at 05:09

## 2017-09-02 RX ADMIN — ALLOPURINOL 300 MG: 300 TABLET ORAL at 08:09

## 2017-09-02 RX ADMIN — AMPICILLIN AND SULBACTAM 3 G: 2; 1 INJECTION, POWDER, FOR SOLUTION INTRAMUSCULAR; INTRAVENOUS at 05:09

## 2017-09-02 RX ADMIN — FAMOTIDINE 20 MG: 20 TABLET ORAL at 08:09

## 2017-09-02 NOTE — PLAN OF CARE
Discharge orders noted, no HH or HME ordered.    Pt to [facility] in local hotel for a few days after D/C, then return to [place] with wife    Future Appointments  Date Time Provider Department Center   9/5/2017 9:40 AM Tereso Yuen DO, FACP [facility] TUM[facility]          09/02/17 1356   Final Note   Assessment Type Final Discharge Note   Discharge Disposition Home   What phone number can be called within the next 1-3 days to see how you are doing after discharge? [phone]   Hospital Follow Up  Appt(s) scheduled? No  (no f/u listed, [place] will follow up)   Right Care Referral Info   Post Acute Recommendation No Care     Sheila Eisenberg, RN Transitional Navigator  [phone]

## 2017-09-02 NOTE — PLAN OF CARE
Problem: Patient Care Overview  Goal: Plan of Care Review  Outcome: Ongoing (interventions implemented as appropriate)  Pt AAOx4, no family at bedside throughout shift. Pt denies pain, n/v/d, and SOB. Pt is ambulatory without any issues, denies numbness or tingling. IVF and nadine infusing per MAR. Regular diet tolerated well. Safety maintained, will cont to monitor.

## 2017-09-02 NOTE — PLAN OF CARE
Pt presents for TACE    Independent with ADLs, lives with supportive wife, in [place]. Staying in hotel for few days after D/C  No HH/HME       09/02/17 1353   Discharge Assessment   Assessment Type Discharge Planning Assessment   Confirmed/corrected address and phone number on facesheet? Yes   Assessment information obtained from? Patient   Expected Length of Stay (days) 1   Communicated expected length of stay with patient/caregiver yes   Prior to hospitilization cognitive status: Alert/Oriented   Prior to hospitalization functional status: Independent   Current cognitive status: Alert/Oriented   Current Functional Status: Independent   Lives With spouse   Able to Return to Prior Arrangements yes   Is patient able to care for self after discharge? Yes   Who are your caregiver(s) and their phone number(s)? wife: Shanon Simms [phone]   Patient's perception of discharge disposition home or selfcare   Readmission Within The Last 30 Days no previous admission in last 30 days   Patient currently being followed by outpatient case management? No   Patient currently receives any other outside agency services? No   Equipment Currently Used at Home none   Do you have any problems affording any of your prescribed medications? No   Is the patient taking medications as prescribed? yes   Does the patient have transportation home? Yes   Transportation Available family or friend will provide;car   Dialysis Name and Scheduled days N/A   Does the patient receive services at the Coumadin Clinic? No   Discharge Plan A Home   Discharge Plan B Home with family   Patient/Family In Agreement With Plan yes     Sheila Eisenberg RN Transitional Navigator  [phone]

## 2017-09-02 NOTE — PROGRESS NOTES
Patient discharged home per MD. Discharge instructions given to patient about medications, prescriptions, signs and symptoms when to call doctor and when to follow up. Patient verbalized complete understanding of discharge instructions. Informed patient where to  prescriptions. Patients family will accompany him home. Patient refused wheelchair.

## 2017-09-02 NOTE — PROGRESS NOTES
[facility]  General Surgery  Progress Note    Subjective:     Interval History: feels good after TACE    Post-Op Info:  Procedure(s) (LRB):  EMBOLIZATION (N/A)          Medications:  Continuous Infusions:   dextrose 5 % and 0.45 % NaCl with KCl 20 mEq 125 mL/hr at 09/02/17 0544     Scheduled Meds:   ampicillin-sulbactim (UNASYN) IVPB  3 g Intravenous Q6H    famotidine  20 mg Oral Q12H     PRN Meds:ibuprofen, omnipaque 350 iohexol, ondansetron, oxycodone-acetaminophen, zolpidem     Objective:     Vital Signs (Most Recent):  Temp: 96.2 °F (35.7 °C) (09/02/17 0845)  Pulse: 64 (09/02/17 0845)  Resp: 18 (09/02/17 0845)  BP: 135/79 (09/02/17 0845)  SpO2: 97 % (09/01/17 1300) Vital Signs (24h Range):  Temp:  [96.2 °F (35.7 °C)-98 °F (36.7 °C)] 96.2 °F (35.7 °C)  Pulse:  [60-75] 64  Resp:  [10-18] 18  SpO2:  [93 %-100 %] 97 %  BP: ()/(61-90) 135/79       Intake/Output Summary (Last 24 hours) at 09/02/17 1025  Last data filed at 09/02/17 0600   Gross per 24 hour   Intake          3003.33 ml   Output             1400 ml   Net          1603.33 ml       Physical Exam  copnsious alert  Heart stabl  Lung dtable  abd soft  Groin noted, stable  Ext: symmetrucal  CNS no deficit    Assessment/Plan:     Active Diagnoses:    Diagnosis Date Noted POA    Metastatic malignant carcinoid tumor to liver [C7B.02] 09/01/2017 Yes      Problems Resolved During this Admission:    Diagnosis Date Noted Date Resolved POA     Doing well    Will dc today  F/u margarita on Tuesday  Discussed riska nd expectastions    Malaika Ramos MD  General Surgery  [facility]

## 2017-09-05 ENCOUNTER — OFFICE VISIT (OUTPATIENT)
Dept: NEUROLOGY | Facility: HOSPITAL | Age: 62
End: 2017-09-05
Attending: INTERNAL MEDICINE
Payer: COMMERCIAL

## 2017-09-05 VITALS
TEMPERATURE: 97 F | HEART RATE: 72 BPM | SYSTOLIC BLOOD PRESSURE: 116 MMHG | WEIGHT: 199.19 LBS | BODY MASS INDEX: 30.19 KG/M2 | DIASTOLIC BLOOD PRESSURE: 74 MMHG | HEIGHT: 68 IN

## 2017-09-05 DIAGNOSIS — C7A.012 MALIGNANT CARCINOID TUMOR OF THE ILEUM: Primary | ICD-10-CM

## 2017-09-05 DIAGNOSIS — C7B.02 SECONDARY MALIGNANT CARCINOID TUMOR OF LIVER: ICD-10-CM

## 2017-09-05 DIAGNOSIS — C7B.8 SECONDARY NEUROENDOCRINE TUMOR OF BONE: ICD-10-CM

## 2017-09-05 LAB
EXT 24 HR UR METANEPHRINE: ABNORMAL
EXT 24 HR UR NORMETANEPHRINE: ABNORMAL
EXT 24 HR UR NORMETANEPHRINE: ABNORMAL
EXT 25 HYDROXY VIT D2: ABNORMAL
EXT 25 HYDROXY VIT D3: ABNORMAL
EXT 5 HIAA 24 HR URINE: ABNORMAL
EXT 5 HIAA BLOOD: 507 NG/ML (ref 0–22)
EXT ACTH: ABNORMAL
EXT AFP: ABNORMAL
EXT ALBUMIN: ABNORMAL
EXT ALKALINE PHOSPHATASE: ABNORMAL
EXT ALT: ABNORMAL
EXT AMYLASE: ABNORMAL
EXT ANTI ISLET CELL AB: ABNORMAL
EXT ANTI PARIETAL CELL AB: ABNORMAL
EXT ANTI THYROID AB: ABNORMAL
EXT AST: ABNORMAL
EXT BILIRUBIN DIRECT: ABNORMAL MG/DL
EXT BILIRUBIN TOTAL: ABNORMAL
EXT BK VIRUS DNA QN PCR: ABNORMAL
EXT BUN: ABNORMAL
EXT C PEPTIDE: ABNORMAL
EXT CA 125: ABNORMAL
EXT CA 19-9: ABNORMAL
EXT CA 27-29: ABNORMAL
EXT CALCITONIN: ABNORMAL
EXT CALCIUM: ABNORMAL
EXT CEA: ABNORMAL
EXT CHLORIDE: ABNORMAL
EXT CHOLESTEROL: ABNORMAL
EXT CHROMOGRANIN A: 324 NG/ML (ref 0–95)
EXT CO2: ABNORMAL
EXT CREATININE UA: ABNORMAL
EXT CREATININE: ABNORMAL MG/DL
EXT CYCLOSPORONE LEVEL: ABNORMAL
EXT DOPAMINE: ABNORMAL
EXT EBV DNA BY PCR: ABNORMAL
EXT EPINEPHRINE: ABNORMAL
EXT FOLATE: ABNORMAL
EXT FREE T3: ABNORMAL
EXT FREE T4: ABNORMAL
EXT FSH: ABNORMAL
EXT GASTRIN RELEASING PEPTIDE: ABNORMAL
EXT GASTRIN RELEASING PEPTIDE: ABNORMAL
EXT GASTRIN: ABNORMAL
EXT GGT: ABNORMAL
EXT GHRELIN: ABNORMAL
EXT GLUCAGON: ABNORMAL
EXT GLUCOSE: ABNORMAL
EXT GROWTH HORMONE: ABNORMAL
EXT HCV RNA QUANT PCR: ABNORMAL
EXT HDL: ABNORMAL
EXT HEMATOCRIT: ABNORMAL
EXT HEMOGLOBIN A1C: ABNORMAL
EXT HEMOGLOBIN: ABNORMAL
EXT HISTAMINE 24 HR URINE: ABNORMAL
EXT HISTAMINE: ABNORMAL
EXT IGF-1: ABNORMAL
EXT IMMUNKNOW (NON-STIMULATED): ABNORMAL
EXT IMMUNKNOW (STIMULATED): ABNORMAL
EXT INR: ABNORMAL
EXT INSULIN: ABNORMAL
EXT LANREOTIDE LEVEL: ABNORMAL
EXT LDH, TOTAL: ABNORMAL
EXT LDL CHOLESTEROL: ABNORMAL
EXT LIPASE: ABNORMAL
EXT MAGNESIUM: ABNORMAL
EXT METANEPHRINE FREE PLASMA: ABNORMAL
EXT MOTILIN: ABNORMAL
EXT NEUROKININ A CAMB: ABNORMAL
EXT NEUROKININ A ISI: <10 PG/ML (ref 0–40)
EXT NEUROTENSIN: ABNORMAL
EXT NOREPINEPHRINE: ABNORMAL
EXT NORMETANEPHRINE: ABNORMAL
EXT NSE: ABNORMAL
EXT OCTREOTIDE LEVEL: ABNORMAL
EXT PANCREASTATIN CAMB: ABNORMAL
EXT PANCREASTATIN ISI: 707 PG/ML (ref 10–135)
EXT PANCREATIC POLYPEPTIDE: ABNORMAL
EXT PHOSPHORUS: ABNORMAL
EXT PLATELETS: ABNORMAL
EXT POTASSIUM: ABNORMAL
EXT PROGRAF LEVEL: ABNORMAL
EXT PROLACTIN: ABNORMAL
EXT PROTEIN TOTAL: ABNORMAL
EXT PROTEIN UA: ABNORMAL
EXT PT: ABNORMAL
EXT PTH, INTACT: ABNORMAL
EXT PTT: ABNORMAL
EXT RAPAMUNE LEVEL: ABNORMAL
EXT SEROTONIN: 591 NG/ML (ref 56–244)
EXT SODIUM: ABNORMAL MMOL/L
EXT SOMATOSTATIN: ABNORMAL
EXT SUBSTANCE P: ABNORMAL
EXT TRIGLYCERIDES: ABNORMAL
EXT TRYPTASE: ABNORMAL
EXT TSH: ABNORMAL
EXT URIC ACID: ABNORMAL
EXT URINE AMYLASE U/HR: ABNORMAL
EXT URINE AMYLASE U/L: ABNORMAL
EXT VASOACTIVE INTESTINAL POLYPEPTIDE: ABNORMAL
EXT VITAMIN B12: ABNORMAL
EXT VMA 24 HR URINE: ABNORMAL
EXT WBC: ABNORMAL
NEURON SPECIFIC ENOLASE: ABNORMAL

## 2017-09-05 PROCEDURE — 3008F BODY MASS INDEX DOCD: CPT | Mod: ,,, | Performed by: INTERNAL MEDICINE

## 2017-09-05 PROCEDURE — 99214 OFFICE O/P EST MOD 30 MIN: CPT | Mod: ,,, | Performed by: INTERNAL MEDICINE

## 2017-09-05 PROCEDURE — 99215 OFFICE O/P EST HI 40 MIN: CPT | Performed by: INTERNAL MEDICINE

## 2017-09-05 RX ORDER — SULFAMETHOXAZOLE AND TRIMETHOPRIM 400; 80 MG/1; MG/1
1 TABLET ORAL
COMMUNITY
End: 2018-03-20

## 2017-09-05 NOTE — PROGRESS NOTES
NOLAN[facility]:  [facility]  A collaboration between [facility] and [facility]    PATIENT: Tereso Simms  MRN: [id number]  DATE: 9/5/2017      Diagnosis:   1. Malignant carcinoid tumor of the ileum    2. Secondary malignant carcinoid tumor of liver    3. Secondary neuroendocrine tumor of bone        Chief Complaint: Follow-up (post TACE)      Oncologic History:      Oncologic History Small intestine neuroendocrine tumor diagnosed 10/2000  Recurrent disease to liver 9/2008  Recurrent disease to bone 2015    Oncologic Treatment Small bowel resection, 10/2000  Cytoreduction 2/2009 ([place])  Sandostatin 2/2012  Radioembolization 5/2015  Morrison embolization 7/2015, 8/2015  Lanreotide 8/2015  CAPTEM 7/2016 - 9/2016  TACE 11/2016  CAPTEM 11/2016  TACE 3/2107  TACE 9/2017    Pathology 10/2000 well-differentiated  2/2008 Ki-67 less than 1%          Subjective:    Interval History: Mr. Simms is a 62 y.o. male who is seen  in follow-up for a small intestine neuroendocrine tumor.  Since I had seen him last he is undergone TACE on 9/1/2017.  He tolerated this procedure well.  He remains on chemotherapy with CAPTEM and is also taking Lanreotide.  He is otherwise feeling well and without new complaints.    He has previously seen Jimenez Nava and Malaika.  His history dates to 10/2000 when he was diagnosed with a terminal ileal  Carcinoid tumor.  He underwent a small bowel resection in 10/2000 with pathology revealing multiple carcinoid tumors with some largest measuring 4 cm. 21 out of 38 lymph nodes were positive for tumor.  There was no evidence of distant disease at that time. He did well with surveillance until 2008 when an octreotide scan had shown uptake in the right lobe of the liver. He underwent cytoreduction by Dr. Urban with pathology showing a neuroendocrine tumor with Ki-67 less than 1%. He was initiated on  Sandostatin in 2/2012.  In 2015 he underwent radioembolization as well as bland embolization. He also was initiated on Lanreotide.  He was initiated on CAPTEM in 7/2016.  He underwent additional TACE in 11/2016, 3/2017 and 9/2017.      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    Secondary neuroendocrine tumor of liver     Secondary neuroendocrine tumor of other sites     pancreas       Past Surgical HIstory:   Past Surgical History:   Procedure Laterality Date    APPENDECTOMY      CEREBRAL ANEURYSM REPAIR      clipped-NO MRI    right hepatic partial lobectomy, t non anatomic tumors removed, RFA right lobe, cholecystectomy, excision of pancreatic tumor, lysis of adhesions,  2[facility]25/2009- [facility] [place]    small bowel resection, lymph node resection, 21/38 positive, right colectomy, lymphovascular, perineural invasion  10/2000    TACE  11/2016    Y-90 microspheres  July 2012-[place]       Family History:   Family History   Problem Relation Age of Onset    Heart failure Father     Diabetes Father     COPD Mother     Cancer Mother      RCC    Cancer Sister      breast       Social History:  reports that he has never smoked. He has never used smokeless tobacco. He reports that he drinks alcohol. He reports that he does not use drugs.    Allergies:  Review of patient's allergies indicates:   Allergen Reactions    Epinephrine Other (See Comments)     Precipitates a carcinoid crisis       Medications:  Current Outpatient Prescriptions   Medication Sig Dispense Refill    ascorbic acid, vitamin C, (VITAMIN C) 1000 MG tablet Take 1,000 mg by mouth once daily.      calcium carbonate (TUMS) 200 mg calcium (500 mg) chewable tablet Take 1 tablet by mouth once daily.      capecitabine (XELODA) 500 MG Tab Take 500 mg by mouth 2 (two) times daily.  Pt ttake 1650mg in am/ 1500mg at dinner      cholecalciferol, vitamin D3, 2,000 unit Tab 1 TABLET DAILY      ciprofloxacin HCl (CIPRO) 750 MG tablet Take 1 tablet (750 mg total) by mouth every 12 (twelve) hours. 14 tablet 0    lanreotide (SOMATULINE DEPOT) 120 mg/0.5 mL Syrg Inject 120 mg into the skin every 30 days.      lisinopril (PRINIVIL,ZESTRIL) 5 MG tablet Take 5 mg by mouth once daily.      loperamide (IMODIUM) 1 mg/5 mL solution Take by mouth 2 (two) times daily.      ondansetron (ZOFRAN) 4 MG tablet Take 1 tablet (4 mg total) by mouth every 6 (six) hours as needed for Nausea. 40 tablet 0    sitagliptan-metformin (JANUMET) 50-1,000 mg per tablet Take 1 tablet by mouth 2 (two) times daily with meals.      sulfamethoxazole-trimethoprim 400-80mg (BACTRIM,SEPTRA) 400-80 mg per tablet Take 1 tablet by mouth every 12 (twelve) hours.      temozolomide (TEMODAR) 100 MG capsule Take 300 mg/m2/day by mouth every evening. Pt take 300mg 5 days out of a month      vitamin D 1000 units Tab Take 185 mg by mouth once daily.      ZOLEDRONIC ACID (ZOMETA IV) Inject into the vein every 30 days.      ondansetron (ZOFRAN-ODT) 4 MG TbDL Take 1 tablet (4 mg total) by mouth every 6 (six) hours as needed. 25 tablet 0    tramadol (ULTRAM) 50 mg tablet Take 1 tablet (50 mg total) by mouth every 6 (six) hours as needed for Pain. 25 tablet 0     No current facility-administered medications for this visit.        Review of Systems   Constitutional: Negative for appetite change, chills, fatigue, fever and unexpected weight change.   HENT: Negative for dental problem, sinus pressure and sneezing.    Eyes: Negative for visual disturbance.   Respiratory: Negative for cough, choking and chest tightness.    Cardiovascular: Negative for chest pain and leg swelling.   Gastrointestinal: Negative for abdominal pain, blood in stool, constipation, diarrhea and nausea.   Genitourinary: Negative for difficulty urinating and dysuria.  "  Musculoskeletal: Negative for arthralgias and back pain.   Skin: Negative for rash and wound.        Flushing   Neurological: Negative for dizziness, light-headedness and headaches.   Hematological: Negative for adenopathy. Does not bruise/bleed easily.   Psychiatric/Behavioral: Negative for sleep disturbance. The patient is not nervous/anxious.        ECOG Performance Status: 0   Objective:      Vitals:   Vitals:    09/05/17 0913   BP: 116/74   Pulse: 72   Temp: 97.2 °F (36.2 °C)   TempSrc: Oral   Weight: 90.4 kg (199 lb 3.2 oz)   Height: 5' 8" (1.727 m)     BMI: Body mass index is 30.29 kg/m².    Physical Exam   Constitutional: He is oriented to person, place, and time. He appears well-developed and well-nourished.   HENT:   Head: Normocephalic and atraumatic.   Eyes: Pupils are equal, round, and reactive to light.   Neck: Normal range of motion. Neck supple.   Cardiovascular: Normal rate and regular rhythm.    Pulmonary/Chest: Effort normal and breath sounds normal. No respiratory distress.   Abdominal: Soft. He exhibits no distension. There is no tenderness.   Musculoskeletal: He exhibits no edema or tenderness.   Lymphadenopathy:     He has no cervical adenopathy.   Neurological: He is alert and oriented to person, place, and time. No cranial nerve deficit.   Skin: Skin is warm and dry.   Psychiatric: He has a normal mood and affect. His behavior is normal.       Laboratory Data:  Admission on 09/01/2017, Discharged on 09/02/2017   Component Date Value Ref Range Status    POCT Glucose 09/01/2017 200* 70 - 110 mg/dL Final   Lab Visit on 08/31/2017   Component Date Value Ref Range Status    Sodium 08/31/2017 135* 136 - 145 mmol/L Final    Potassium 08/31/2017 4.0  3.5 - 5.1 mmol/L Final    Chloride 08/31/2017 100  95 - 110 mmol/L Final    CO2 08/31/2017 30* 23 - 29 mmol/L Final    Glucose 08/31/2017 178* 70 - 110 mg/dL Final    BUN, Bld 08/31/2017 18  8 - 23 mg/dL Final    Creatinine 08/31/2017 1.2  0.5 " - 1.4 mg/dL Final    Calcium 08/31/2017 9.8  8.7 - 10.5 mg/dL Final    Total Protein 08/31/2017 9.3* 6.0 - 8.4 g/dL Final    Albumin 08/31/2017 3.8  3.5 - 5.2 g/dL Final    Total Bilirubin 08/31/2017 2.2* 0.1 - 1.0 mg/dL Final    Comment: For infants and newborns, interpretation of results should be based  on gestational age, weight and in agreement with clinical  observations.  Premature Infant recommended reference ranges:  Up to 24 hours.............<8.0 mg/dL  Up to 48 hours............<12.0 mg/dL  3-5 days..................<15.0 mg/dL  6-29 days.................<15.0 mg/dL      Alkaline Phosphatase 08/31/2017 149* 55 - 135 U/L Final    AST 08/31/2017 56* 10 - 40 U/L Final    ALT 08/31/2017 50* 10 - 44 U/L Final    Anion Gap 08/31/2017 5* 8 - 16 mmol/L Final    eGFR if African American 08/31/2017 >60  >60 mL/min/1.73 m^2 Final    eGFR if non African American 08/31/2017 >60  >60 mL/min/1.73 m^2 Final    Comment: Calculation used to obtain the estimated glomerular filtration  rate (eGFR) is the CKD-EPI equation. Since race is unknown   in our information system, the eGFR values for   -American and Non--American patients are given   for each creatinine result.      WBC 08/31/2017 4.77  3.90 - 12.70 K/uL Final    RBC 08/31/2017 4.10* 4.60 - 6.20 M/uL Final    Hemoglobin 08/31/2017 13.9* 14.0 - 18.0 g/dL Final    Hematocrit 08/31/2017 40.2  40.0 - 54.0 % Final    MCV 08/31/2017 98  82 - 98 fL Final    MCH 08/31/2017 33.9* 27.0 - 31.0 pg Final    MCHC 08/31/2017 34.6  32.0 - 36.0 g/dL Final    RDW 08/31/2017 16.1* 11.5 - 14.5 % Final    Platelets 08/31/2017 103* 150 - 350 K/uL Final    MPV 08/31/2017 9.5  9.2 - 12.9 fL Final    Gran # 08/31/2017 3.4  1.8 - 7.7 K/uL Final    Comment: The ANC is based on a white cell differential from an   automated cell counter. It has not been microscopically   reviewed for the presence of abnormal cells. Clinical   correlation is required.       Prothrombin Time 08/31/2017 10.4  9.0 - 12.5 sec Final    INR 08/31/2017 1.0  0.8 - 1.2 Final    Comment: Coumadin Therapy:  2.0 - 3.0 for INR for all indicators except mechanical heart valves  and antiphospholipid syndromes which should use 2.5 - 3.5.         Imaging:   Outside images reviewed.         Assessment:       1. Malignant carcinoid tumor of the ileum    2. Secondary malignant carcinoid tumor of liver    3. Secondary neuroendocrine tumor of bone           Plan:     Mr. Simms continues on with chemotherapy with CAPTEM and tolerating this well.  He has just undergone another TACE.  At this point I would continue him on with CAPTEM and plan to repeat scans in another month including a CT scan of the abdomen and also a gallium-68 PET/CT.  We will discuss his case in our tumor board after receipt of the scans.  He should continue on with Lanreotide.  He should also continue on with zoledronic acid every 3 months.  We will notify him of recommendations following review of his scans.  Check tumor markers now.  Multiple questions were answered and he is agreeable with this plan.    Tereso Yuen DO, FACP  Hematology & Oncology, [facility]  [address]  [place]  [address]  ph. [phone]; [phone]  fax. [phone]    25 minutes were spent in coordination of patient's care, record review and counseling.  More than 50% of the time was face-to-face.

## 2017-09-05 NOTE — PATIENT INSTRUCTIONS
Have Scans in October at home, please have mailed to us ASAP    Have markers today in [address]    We will talk about you in tumor board after your scans are received.

## 2017-09-05 NOTE — LETTER
September 5, 2017        MD Prema Khan & Negrito Steele  [facility] [id number] [facility] [place] [address]  Phone: [phone]  Fax: [phone]   Patient: Tereso Simms   MR Number: [id number]   YOB: 1955   Date of Visit: 9/5/2017       Dear Dr. Ramires:    Thank you for referring Tereso Simms to me for evaluation. Attached you will find relevant portions of my assessment and plan of care.    If you have questions, please do not hesitate to call me. I look forward to following Tereso Simms along with you.    Sincerely,      Tereso Yuen DO, FACP            CC  Elijah Nava MD    [facility]

## 2017-09-08 ENCOUNTER — TELEPHONE (OUTPATIENT)
Dept: INTERVENTIONAL RADIOLOGY/VASCULAR | Facility: HOSPITAL | Age: 62
End: 2017-09-08

## 2017-09-14 LAB
EXT 24 HR UR METANEPHRINE: ABNORMAL
EXT 24 HR UR NORMETANEPHRINE: ABNORMAL
EXT 24 HR UR NORMETANEPHRINE: ABNORMAL
EXT 25 HYDROXY VIT D2: ABNORMAL
EXT 25 HYDROXY VIT D3: ABNORMAL
EXT 5 HIAA 24 HR URINE: ABNORMAL
EXT 5 HIAA BLOOD: ABNORMAL
EXT ACTH: ABNORMAL
EXT AFP: ABNORMAL
EXT ALBUMIN: 4.3 G/DL (ref 3.5–5)
EXT ALKALINE PHOSPHATASE: 217 IU/L (ref 38–126)
EXT ALT: 60 IU/L (ref 11–66)
EXT AMYLASE: ABNORMAL
EXT ANTI ISLET CELL AB: ABNORMAL
EXT ANTI PARIETAL CELL AB: ABNORMAL
EXT ANTI THYROID AB: ABNORMAL
EXT AST: 63 IU/L (ref 15–46)
EXT BILIRUBIN DIRECT: ABNORMAL
EXT BILIRUBIN TOTAL: 1.5 MG/DL (ref 0.2–1.3)
EXT BK VIRUS DNA QN PCR: ABNORMAL
EXT BUN: 21 MG/DL (ref 9–20)
EXT C PEPTIDE: ABNORMAL
EXT CA 125: ABNORMAL
EXT CA 19-9: ABNORMAL
EXT CA 27-29: ABNORMAL
EXT CALCITONIN: ABNORMAL
EXT CALCIUM: 9.8 MG/DL (ref 8.4–10.2)
EXT CEA: ABNORMAL
EXT CHLORIDE: 107 MEQ/L (ref 100–110)
EXT CHOLESTEROL: ABNORMAL
EXT CHROMOGRANIN A: ABNORMAL
EXT CO2: 26 MEQ/L (ref 22–30)
EXT CREATININE UA: ABNORMAL
EXT CREATININE: 0.95 MG/DL (ref 0.66–1.25)
EXT CYCLOSPORONE LEVEL: ABNORMAL
EXT DOPAMINE: ABNORMAL
EXT EBV DNA BY PCR: ABNORMAL
EXT EPINEPHRINE: ABNORMAL
EXT FOLATE: ABNORMAL
EXT FREE T3: ABNORMAL
EXT FREE T4: ABNORMAL
EXT FSH: ABNORMAL
EXT GASTRIN RELEASING PEPTIDE: ABNORMAL
EXT GASTRIN RELEASING PEPTIDE: ABNORMAL
EXT GASTRIN: ABNORMAL
EXT GGT: ABNORMAL
EXT GHRELIN: ABNORMAL
EXT GLUCAGON: ABNORMAL
EXT GLUCOSE: 155 MG/DL (ref 75–125)
EXT GROWTH HORMONE: ABNORMAL
EXT HCV RNA QUANT PCR: ABNORMAL
EXT HDL: ABNORMAL
EXT HEMATOCRIT: 38.8 % (ref 38–52)
EXT HEMOGLOBIN A1C: ABNORMAL
EXT HEMOGLOBIN: 13.4 G/DL (ref 13.5–17.5)
EXT HISTAMINE 24 HR URINE: ABNORMAL
EXT HISTAMINE: ABNORMAL
EXT IGF-1: ABNORMAL
EXT IMMUNKNOW (NON-STIMULATED): ABNORMAL
EXT IMMUNKNOW (STIMULATED): ABNORMAL
EXT INR: ABNORMAL
EXT INSULIN: ABNORMAL
EXT LANREOTIDE LEVEL: ABNORMAL
EXT LDH, TOTAL: ABNORMAL
EXT LDL CHOLESTEROL: ABNORMAL
EXT LIPASE: ABNORMAL
EXT MAGNESIUM: ABNORMAL
EXT METANEPHRINE FREE PLASMA: ABNORMAL
EXT MOTILIN: ABNORMAL
EXT NEUROKININ A CAMB: ABNORMAL
EXT NEUROKININ A ISI: ABNORMAL
EXT NEUROTENSIN: ABNORMAL
EXT NOREPINEPHRINE: ABNORMAL
EXT NORMETANEPHRINE: ABNORMAL
EXT NSE: ABNORMAL
EXT OCTREOTIDE LEVEL: ABNORMAL
EXT PANCREASTATIN CAMB: ABNORMAL
EXT PANCREASTATIN ISI: ABNORMAL
EXT PANCREATIC POLYPEPTIDE: ABNORMAL
EXT PHOSPHORUS: ABNORMAL
EXT PLATELETS: 170 X10(9)/L (ref 150–450)
EXT POTASSIUM: 4.7 MEQ/L (ref 3.4–5)
EXT PROGRAF LEVEL: ABNORMAL
EXT PROLACTIN: ABNORMAL
EXT PROTEIN TOTAL: 9.3 G/DL (ref 6.3–8.2)
EXT PROTEIN UA: ABNORMAL
EXT PT: ABNORMAL
EXT PTH, INTACT: ABNORMAL
EXT PTT: ABNORMAL
EXT RAPAMUNE LEVEL: ABNORMAL
EXT SEROTONIN: ABNORMAL
EXT SODIUM: 142 MEQ/L (ref 137–145)
EXT SOMATOSTATIN: ABNORMAL
EXT SUBSTANCE P: ABNORMAL
EXT TRIGLYCERIDES: ABNORMAL
EXT TRYPTASE: ABNORMAL
EXT TSH: ABNORMAL
EXT URIC ACID: ABNORMAL
EXT URINE AMYLASE U/HR: ABNORMAL
EXT URINE AMYLASE U/L: ABNORMAL
EXT VASOACTIVE INTESTINAL POLYPEPTIDE: ABNORMAL
EXT VITAMIN B12: ABNORMAL
EXT VMA 24 HR URINE: ABNORMAL
EXT WBC: 5.99 X10(9)/L (ref 4–11)
NEURON SPECIFIC ENOLASE: ABNORMAL

## 2017-09-18 NOTE — DISCHARGE SUMMARY
[facility]  General Surgery  Discharge Summary      Patient Name: Tereso Simms  MRN: [id number]  Admission Date: 9/1/2017  Hospital Length of Stay: 1 days  Discharge Date and Time: 9/2/2017  2:02 PM  Attending Physician: No att. 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    Procedure(s) (LRB):  EMBOLIZATION (N/A)     Hospital Course:  Presented for TACE procedure, with successful embolization of right hepatic artery. Post op course uncomplicated. Pain controlled, tolerating diet and ambulating on POD#1, discharged home with pain and antiemetic medication and 1 weeks abx.     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  Medications:  Reconciled Home Medications:   Discharge Medication List as of 9/2/2017  1:45 PM      START taking these medications    Details   ciprofloxacin HCl (CIPRO) 750 MG tablet Take 1 tablet (750 mg total) by mouth every 12 (twelve) hours., Starting Sat 9/2/2017, Until Sat 9/9/2017, Print      ondansetron (ZOFRAN-ODT) 4 MG TbDL Take 1 tablet (4 mg total) by mouth every 6 (six) hours as needed., Starting Sat 9/2/2017, Print      tramadol (ULTRAM) 50 mg tablet Take 1 tablet (50 mg total) by mouth every 6 (six) hours as needed for Pain., Starting Sat 9/2/2017, Until Tue 9/12/2017, Print         CONTINUE these medications which have NOT CHANGED    Details   calcium carbonate (TUMS) 200 mg calcium (500 mg)  chewable tablet Take 1 tablet by mouth once daily., Historical Med      capecitabine (XELODA) 500 MG Tab Take 500 mg by mouth 2 (two) times daily. Pt ttake 1650mg in am/ 1500mg at dinner, Until Discontinued, Historical Med      !! cholecalciferol, vitamin D3, 2,000 unit Tab 1 TABLET DAILY, Historical Med      lisinopril (PRINIVIL,ZESTRIL) 5 MG tablet Take 5 mg by mouth once daily., Until Discontinued, Historical Med      loperamide (IMODIUM) 1 mg/5 mL solution Take by mouth 2 (two) times daily., Historical Med      ondansetron (ZOFRAN) 4 MG tablet Take 1 tablet (4 mg total) by mouth every 6 (six) hours as needed for Nausea., Starting 3/11/2017, Until Discontinued, Print      sitagliptan-metformin (JANUMET) 50-1,000 mg per tablet Take 1 tablet by mouth 2 (two) times daily with meals., Until Discontinued, Historical Med      temozolomide (TEMODAR) 100 MG capsule Take 300 mg/m2/day by mouth every evening. Pt take 300mg 5 days out of a month, Until Discontinued, Historical Med      !! vitamin D 1000 units Tab Take 185 mg by mouth once daily., Until Discontinued, Historical Med      ZOLEDRONIC ACID (ZOMETA IV) Inject into the vein every 30 days., Until Discontinued, Historical Med      ascorbic acid, vitamin C, (VITAMIN C) 1000 MG tablet Take 1,000 mg by mouth once daily., Until Discontinued, Historical Med      lanreotide (SOMATULINE DEPOT) 120 mg/0.5 mL Syrg Inject 120 mg into the skin every 30 days., Until Discontinued, Historical Med       !! - Potential duplicate medications found. Please discuss with provider.          Kathie Conrad MD  General Surgery  [facility]

## 2017-09-21 ENCOUNTER — TELEPHONE (OUTPATIENT)
Dept: NEUROLOGY | Facility: HOSPITAL | Age: 62
End: 2017-09-21

## 2017-09-21 NOTE — TELEPHONE ENCOUNTER
Returned pt's call after speaking with Nurse and instructed pt that lab results for Chromogranin A value on 9/5/17 might be affected since he just had Tace Done on 9/1/17. Pt verbalized understanding. Pt states that he will fax CBC and CMP done 9/14/17 to the neuroendocrine office since he has a copy with him.

## 2017-09-21 NOTE — TELEPHONE ENCOUNTER
Called pt @ [phone]. And he states he did have [facility] on 9/14/17.  He states [facility] should have faxed lab results here/Dr Yuen.  Instructed pt that I would look for them.  Pt states he is feeling great, and is visiting some family and friends in [place].  He does have a question regarding elevation of Chromogranin A results and wants to know if this normal after a TACE procedure.  I instructed him I would have someone call him back regarding this question.

## 2017-10-30 LAB
EXT 24 HR UR METANEPHRINE: ABNORMAL
EXT 24 HR UR NORMETANEPHRINE: ABNORMAL
EXT 24 HR UR NORMETANEPHRINE: ABNORMAL
EXT 25 HYDROXY VIT D2: ABNORMAL
EXT 25 HYDROXY VIT D3: ABNORMAL
EXT 5 HIAA 24 HR URINE: ABNORMAL
EXT 5 HIAA BLOOD: ABNORMAL
EXT ACTH: ABNORMAL
EXT AFP: ABNORMAL
EXT ALBUMIN: 4.2 G/DL (ref 3.5–5)
EXT ALKALINE PHOSPHATASE: 123 IU/L (ref 38–126)
EXT ALT: 43 IU/L (ref 11–66)
EXT AMYLASE: ABNORMAL
EXT ANTI ISLET CELL AB: ABNORMAL
EXT ANTI PARIETAL CELL AB: ABNORMAL
EXT ANTI THYROID AB: ABNORMAL
EXT AST: 46 IU/L (ref 15–46)
EXT BILIRUBIN DIRECT: ABNORMAL
EXT BILIRUBIN TOTAL: 2.1 MG/DL (ref 0.2–1.3)
EXT BK VIRUS DNA QN PCR: ABNORMAL
EXT BUN: 21 MG/DL (ref 9–20)
EXT C PEPTIDE: ABNORMAL
EXT CA 125: ABNORMAL
EXT CA 19-9: ABNORMAL
EXT CA 27-29: ABNORMAL
EXT CALCITONIN: ABNORMAL
EXT CALCIUM: 9.2 MG/DL (ref 8.4–10.2)
EXT CEA: ABNORMAL
EXT CHLORIDE: 107 MEQ/L (ref 100–110)
EXT CHOLESTEROL: ABNORMAL
EXT CHROMOGRANIN A: 8 NMOL/L (ref 0–5)
EXT CO2: 26 MEQ/L (ref 22–30)
EXT CREATININE UA: ABNORMAL
EXT CREATININE: 1 MG/DL (ref 0.66–1.25)
EXT CYCLOSPORONE LEVEL: ABNORMAL
EXT DOPAMINE: ABNORMAL
EXT EBV DNA BY PCR: ABNORMAL
EXT EPINEPHRINE: ABNORMAL
EXT FOLATE: ABNORMAL
EXT FREE T3: ABNORMAL
EXT FREE T4: ABNORMAL
EXT FSH: ABNORMAL
EXT GASTRIN RELEASING PEPTIDE: ABNORMAL
EXT GASTRIN RELEASING PEPTIDE: ABNORMAL
EXT GASTRIN: ABNORMAL
EXT GGT: ABNORMAL
EXT GHRELIN: ABNORMAL
EXT GLUCAGON: ABNORMAL
EXT GLUCOSE: 199 MG/DL (ref 75–125)
EXT GROWTH HORMONE: ABNORMAL
EXT HCV RNA QUANT PCR: ABNORMAL
EXT HDL: ABNORMAL
EXT HEMATOCRIT: 39.8 % (ref 38–52)
EXT HEMOGLOBIN A1C: ABNORMAL
EXT HEMOGLOBIN: 13.6 G/DL (ref 13.5–17.5)
EXT HISTAMINE 24 HR URINE: ABNORMAL
EXT HISTAMINE: ABNORMAL
EXT IGF-1: ABNORMAL
EXT IMMUNKNOW (NON-STIMULATED): ABNORMAL
EXT IMMUNKNOW (STIMULATED): ABNORMAL
EXT INR: ABNORMAL
EXT INSULIN: ABNORMAL
EXT LANREOTIDE LEVEL: ABNORMAL
EXT LDH, TOTAL: ABNORMAL
EXT LDL CHOLESTEROL: ABNORMAL
EXT LIPASE: ABNORMAL
EXT MAGNESIUM: ABNORMAL
EXT METANEPHRINE FREE PLASMA: ABNORMAL
EXT MOTILIN: ABNORMAL
EXT NEUROKININ A CAMB: ABNORMAL
EXT NEUROKININ A ISI: ABNORMAL
EXT NEUROTENSIN: ABNORMAL
EXT NOREPINEPHRINE: ABNORMAL
EXT NORMETANEPHRINE: ABNORMAL
EXT NSE: ABNORMAL
EXT OCTREOTIDE LEVEL: ABNORMAL
EXT PANCREASTATIN CAMB: ABNORMAL
EXT PANCREASTATIN ISI: ABNORMAL
EXT PANCREATIC POLYPEPTIDE: ABNORMAL
EXT PHOSPHORUS: ABNORMAL
EXT PLATELETS: 86 X10(9)/L (ref 150–450)
EXT POTASSIUM: 4.3 MEQ/L (ref 3.5–5)
EXT PROGRAF LEVEL: ABNORMAL
EXT PROLACTIN: ABNORMAL
EXT PROTEIN TOTAL: 8.5 G/DL (ref 6.3–8.2)
EXT PROTEIN UA: ABNORMAL
EXT PT: ABNORMAL
EXT PTH, INTACT: ABNORMAL
EXT PTT: ABNORMAL
EXT RAPAMUNE LEVEL: ABNORMAL
EXT SEROTONIN: 410 NG/ML (ref 21–321)
EXT SODIUM: 140 MEQ/L (ref 137–145)
EXT SOMATOSTATIN: ABNORMAL
EXT SUBSTANCE P: ABNORMAL
EXT TRIGLYCERIDES: ABNORMAL
EXT TRYPTASE: ABNORMAL
EXT TSH: ABNORMAL
EXT URIC ACID: ABNORMAL
EXT URINE AMYLASE U/HR: ABNORMAL
EXT URINE AMYLASE U/L: ABNORMAL
EXT VASOACTIVE INTESTINAL POLYPEPTIDE: ABNORMAL
EXT VITAMIN B12: ABNORMAL
EXT VMA 24 HR URINE: ABNORMAL
EXT WBC: 4.58 X10(9)/L (ref 4–11)
NEURON SPECIFIC ENOLASE: ABNORMAL

## 2017-11-07 ENCOUNTER — TELEPHONE (OUTPATIENT)
Dept: NEUROLOGY | Facility: HOSPITAL | Age: 62
End: 2017-11-07

## 2017-11-07 NOTE — TELEPHONE ENCOUNTER
----- Message from Charley Witt sent at 11/7/2017  9:01 AM CST -----  Contact: Patient  RR- Patient states he mailed Scans and it should arrive to the office Thursday. Also the patient will be out of town during the winter and has questions about his Lanreotide injections and spacing them out. Please call the patient back at [phone].

## 2017-11-07 NOTE — TELEPHONE ENCOUNTER
"Patient called and let me know that he was sending his discs and they should be arriving this Thursday.  He was told he would be added to tumor board after his Gallium and CT scan.      He also said [place] was going [place]" for [place] and is wanting to "stretch out" his Lanreotide shots to every 5 weeks vs every 4.  He said that he doesn't have the syndrome, is just taking it because he was told it would help his tumor growth.     "

## 2017-11-09 ENCOUNTER — TELEPHONE (OUTPATIENT)
Dept: NEUROLOGY | Facility: HOSPITAL | Age: 62
End: 2017-11-09

## 2017-11-09 NOTE — TELEPHONE ENCOUNTER
See previous message.      I also left a message with REBECA Judge RN to add him on tumor board if his scans are loaded tomorrow re: progression?? And does he need another TACE.

## 2017-11-09 NOTE — TELEPHONE ENCOUNTER
I let the patient know that Dr. Yuen stated that every 5 week Lanreotide but the recommended dose is every 4 weeks.     I also confirmed we got his scans in the mail today.  If they are loaded tomorrow we will add him tomorrow.

## 2017-11-09 NOTE — TELEPHONE ENCOUNTER
----- Message from Jeffery Caceres sent at 11/9/2017  1:30 PM CST -----  Contact: Pt   Pt states Cris gave him a call and he's returning her call     Pt states he'll be driving and will like a callback around 3:00P    Contact::[phone]

## 2017-11-13 ENCOUNTER — CONFERENCE (OUTPATIENT)
Dept: NEUROLOGY | Facility: HOSPITAL | Age: 62
End: 2017-11-13

## 2017-11-13 NOTE — TELEPHONE ENCOUNTER
[facility]      NEUROENDOCRINE TUMOR MULTIDISCIPLINARY TUMOR BOARD  _____________________________________________________________________    PRESENTER:   Tereso Yuen DO    REASON FOR PRESENTATION:  Treatment Plan, Scan Review and Liver Directed Therapy    ATTENDEES:   Surgery:              MD WILLIAM Conde MD T. 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    Secondary neuroendocrine tumor of liver     Secondary neuroendocrine tumor of other sites     pancreas       Past Surgical History:   Procedure Laterality Date    APPENDECTOMY      CEREBRAL ANEURYSM REPAIR      clipped-NO MRI    right hepatic partial lobectomy, t non anatomic tumors removed, RFA right lobe, cholecystectomy, excision of pancreatic tumor, lysis of adhesions,  2/25/2009- [facility]    small bowel resection, lymph node resection, 21/38 positive, right colectomy, lymphovascular, perineural invasion  10/2000    TACE  11/2016    Y-90 microspheres  July 2012-[facility]     ________________________________________________________________    DISCUSSION:  Scans reviewed      BOARD RECOMMENDATIONS:   Needs new imaging with MRI

## 2017-11-27 ENCOUNTER — TELEPHONE (OUTPATIENT)
Dept: NEUROLOGY | Facility: HOSPITAL | Age: 62
End: 2017-11-27

## 2017-11-27 NOTE — TELEPHONE ENCOUNTER
Let patient know that his CDs were not loaded when we were prepared to speak about him in tumor board.  I let him know he is scheduled for Dec. 5 and I will fax the results of tumor board to his local oncologist along with call him.

## 2017-11-27 NOTE — TELEPHONE ENCOUNTER
----- Message from Madiha Desir sent at 11/27/2017  9:12 AM CST -----  Contact: Patient  RR- Patient called regarding the results of tumor board. Patient would like to speak to nurse on this matter. Call back number [phone]

## 2017-12-04 ENCOUNTER — CONFERENCE (OUTPATIENT)
Dept: NEUROLOGY | Facility: HOSPITAL | Age: 62
End: 2017-12-04

## 2017-12-04 NOTE — TELEPHONE ENCOUNTER
[facility]      NEUROENDOCRINE TUMOR MULTIDISCIPLINARY TUMOR BOARD  _____________________________________________________________________    PRESENTER:   Tereso Yuen DO    REASON FOR PRESENTATION:  Treatment Plan and Scan Review    ATTENDEES:   Surgery:              MD WILLIAM Conde MD T. 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    Secondary neuroendocrine tumor of liver     Secondary neuroendocrine tumor of other sites     pancreas       Past Surgical History:   Procedure Laterality Date    APPENDECTOMY      CEREBRAL ANEURYSM REPAIR      clipped-NO MRI    right hepatic partial lobectomy, t non anatomic tumors removed, RFA right lobe, cholecystectomy, excision of pancreatic tumor, lysis of adhesions,  2/25/2009- [facility]    small bowel resection, lymph node resection, 21/38 positive, right colectomy, lymphovascular, perineural invasion  10/2000    TACE  11/2016    Y-90 microspheres  July 2012-[facility]     ________________________________________________________________    DISCUSSION:  Radiology review: All liver lesions are hypodense. I wouldn't repeat TACE at this time. No new lesions. However, I'd prefer either an MRI (not sure why pt cannot get one) or a Ga68 to see if there's any residual      BOARD RECOMMENDATIONS:     Repeat imaging in 3 months, gallium and ct   Continue captem  Appointment with Dr Yuen from now

## 2017-12-05 ENCOUNTER — TELEPHONE (OUTPATIENT)
Dept: NEUROLOGY | Facility: HOSPITAL | Age: 62
End: 2017-12-05

## 2017-12-05 DIAGNOSIS — C7B.8 SECONDARY NEUROENDOCRINE TUMOR OF LIVER: Primary | ICD-10-CM

## 2017-12-05 NOTE — TELEPHONE ENCOUNTER
Spoke to patient, reviewed tumor board recommendations.  Will send notes and orders to Dr. Stone.  He asked if he can delay taking his CAPTEM for a week while he is on a cruise, I told him that should be OK>

## 2017-12-05 NOTE — TELEPHONE ENCOUNTER
----- Message from Madiha Desir sent at 12/5/2017 10:39 AM CST -----  Contact: Patient  RR- Patient was calling nurse back. Call back number [phone]

## 2017-12-11 ENCOUNTER — TELEPHONE (OUTPATIENT)
Dept: NEUROLOGY | Facility: HOSPITAL | Age: 62
End: 2017-12-11

## 2017-12-11 NOTE — TELEPHONE ENCOUNTER
LVM to call and change appt and I would let the  staff that I will change his gallium scan accordingly.

## 2017-12-11 NOTE — TELEPHONE ENCOUNTER
----- Message from Madiha Desir sent at 12/11/2017 10:23 AM CST -----  Contact: Reschedule Chasity MCDERMOTT- Pateint needs to be reschedule due to Dr Yuen not in clinic on 3/9/2018. Patient has a gallium at main campus on Thursday.

## 2018-01-12 LAB
EXT 24 HR UR METANEPHRINE: ABNORMAL
EXT 24 HR UR NORMETANEPHRINE: ABNORMAL
EXT 24 HR UR NORMETANEPHRINE: ABNORMAL
EXT 25 HYDROXY VIT D2: ABNORMAL
EXT 25 HYDROXY VIT D3: ABNORMAL
EXT 5 HIAA 24 HR URINE: ABNORMAL
EXT 5 HIAA BLOOD: ABNORMAL
EXT ACTH: ABNORMAL
EXT AFP: ABNORMAL
EXT ALBUMIN: 4.2 G/DL (ref 3.5–5)
EXT ALKALINE PHOSPHATASE: 177 IU/L (ref 38–126)
EXT ALT: 43 IU/L (ref 11–66)
EXT AMYLASE: ABNORMAL
EXT ANTI ISLET CELL AB: ABNORMAL
EXT ANTI PARIETAL CELL AB: ABNORMAL
EXT ANTI THYROID AB: ABNORMAL
EXT AST: 54 IU/L (ref 15–46)
EXT BILIRUBIN DIRECT: ABNORMAL MG/DL
EXT BILIRUBIN TOTAL: 1.6 MG/DL (ref 0.2–1.3)
EXT BK VIRUS DNA QN PCR: ABNORMAL
EXT BUN: 24 MG/DL (ref 9–20)
EXT C PEPTIDE: ABNORMAL
EXT CA 125: ABNORMAL
EXT CA 19-9: ABNORMAL
EXT CA 27-29: ABNORMAL
EXT CALCITONIN: ABNORMAL
EXT CALCIUM: 8.7 MG/DL (ref 8.4–10.2)
EXT CEA: ABNORMAL
EXT CHLORIDE: 107 MEQ/L (ref 100–110)
EXT CHOLESTEROL: ABNORMAL
EXT CHROMOGRANIN A: ABNORMAL
EXT CO2: 25 MEQ/L (ref 22–30)
EXT CREATININE UA: ABNORMAL
EXT CREATININE: 1.05 MG/DL (ref 0.66–1.25)
EXT CYCLOSPORONE LEVEL: ABNORMAL
EXT DOPAMINE: ABNORMAL
EXT EBV DNA BY PCR: ABNORMAL
EXT EPINEPHRINE: ABNORMAL
EXT FOLATE: ABNORMAL
EXT FREE T3: ABNORMAL
EXT FREE T4: ABNORMAL
EXT FSH: ABNORMAL
EXT GASTRIN RELEASING PEPTIDE: ABNORMAL
EXT GASTRIN RELEASING PEPTIDE: ABNORMAL
EXT GASTRIN: ABNORMAL
EXT GGT: ABNORMAL
EXT GHRELIN: ABNORMAL
EXT GLUCAGON: ABNORMAL
EXT GLUCOSE: 195 MG/DL (ref 75–125)
EXT GROWTH HORMONE: ABNORMAL
EXT HCV RNA QUANT PCR: ABNORMAL
EXT HDL: ABNORMAL
EXT HEMATOCRIT: 39.7 % (ref 38–52)
EXT HEMOGLOBIN A1C: ABNORMAL
EXT HEMOGLOBIN: 13.5 G/DL (ref 13.5–17.5)
EXT HISTAMINE 24 HR URINE: ABNORMAL
EXT HISTAMINE: ABNORMAL
EXT IGF-1: ABNORMAL
EXT IMMUNKNOW (NON-STIMULATED): ABNORMAL
EXT IMMUNKNOW (STIMULATED): ABNORMAL
EXT INR: ABNORMAL
EXT INSULIN: ABNORMAL
EXT LANREOTIDE LEVEL: ABNORMAL
EXT LDH, TOTAL: ABNORMAL
EXT LDL CHOLESTEROL: ABNORMAL
EXT LIPASE: ABNORMAL
EXT MAGNESIUM: ABNORMAL
EXT METANEPHRINE FREE PLASMA: ABNORMAL
EXT MOTILIN: ABNORMAL
EXT NEUROKININ A CAMB: ABNORMAL
EXT NEUROKININ A ISI: ABNORMAL
EXT NEUROTENSIN: ABNORMAL
EXT NOREPINEPHRINE: ABNORMAL
EXT NORMETANEPHRINE: ABNORMAL
EXT NSE: ABNORMAL
EXT OCTREOTIDE LEVEL: ABNORMAL
EXT PANCREASTATIN CAMB: ABNORMAL
EXT PANCREASTATIN ISI: ABNORMAL
EXT PANCREATIC POLYPEPTIDE: ABNORMAL
EXT PHOSPHORUS: ABNORMAL
EXT PLATELETS: 59.3 % (ref 25–65)
EXT POTASSIUM: 4.4 MEQ/L (ref 3.4–5)
EXT PROGRAF LEVEL: ABNORMAL
EXT PROLACTIN: ABNORMAL
EXT PROTEIN TOTAL: 8.7 G/DL (ref 6.3–8.2)
EXT PROTEIN UA: ABNORMAL
EXT PT: ABNORMAL
EXT PTH, INTACT: ABNORMAL
EXT PTT: ABNORMAL
EXT RAPAMUNE LEVEL: ABNORMAL
EXT SEROTONIN: ABNORMAL
EXT SODIUM: 138 MEQ/L (ref 137–145)
EXT SOMATOSTATIN: ABNORMAL
EXT SUBSTANCE P: ABNORMAL
EXT TRIGLYCERIDES: ABNORMAL
EXT TRYPTASE: ABNORMAL
EXT TSH: ABNORMAL
EXT URIC ACID: ABNORMAL
EXT URINE AMYLASE U/HR: ABNORMAL
EXT URINE AMYLASE U/L: ABNORMAL
EXT VASOACTIVE INTESTINAL POLYPEPTIDE: ABNORMAL
EXT VITAMIN B12: ABNORMAL
EXT VMA 24 HR URINE: ABNORMAL
EXT WBC: 5.15 X10(9)/L (ref 4–11)
NEURON SPECIFIC ENOLASE: ABNORMAL

## 2018-03-07 LAB
EXT 24 HR UR METANEPHRINE: ABNORMAL
EXT 24 HR UR NORMETANEPHRINE: ABNORMAL
EXT 24 HR UR NORMETANEPHRINE: ABNORMAL
EXT 25 HYDROXY VIT D2: ABNORMAL
EXT 25 HYDROXY VIT D3: ABNORMAL
EXT 5 HIAA 24 HR URINE: ABNORMAL
EXT 5 HIAA BLOOD: ABNORMAL
EXT ACTH: ABNORMAL
EXT AFP: ABNORMAL
EXT ALBUMIN: 3.9 GM/DL (ref 3.3–4.8)
EXT ALKALINE PHOSPHATASE: 204 U/L (ref 34–104)
EXT ALT: 43 U/L (ref 7–52)
EXT AMYLASE: ABNORMAL
EXT ANTI ISLET CELL AB: ABNORMAL
EXT ANTI PARIETAL CELL AB: ABNORMAL
EXT ANTI THYROID AB: ABNORMAL
EXT AST: 49 U/L (ref 13–39)
EXT BILIRUBIN DIRECT: ABNORMAL MG/DL
EXT BILIRUBIN TOTAL: 1 MG/DL (ref 0.3–10)
EXT BK VIRUS DNA QN PCR: ABNORMAL
EXT BUN: 23 MG/DL (ref 8–27)
EXT C PEPTIDE: ABNORMAL
EXT CA 125: ABNORMAL
EXT CA 19-9: ABNORMAL
EXT CA 27-29: ABNORMAL
EXT CALCITONIN: ABNORMAL
EXT CALCIUM: 9.8 MG/DL (ref 8.6–10.3)
EXT CEA: ABNORMAL
EXT CHLORIDE: 106 MMOL/L (ref 98–107)
EXT CHOLESTEROL: ABNORMAL
EXT CHROMOGRANIN A: ABNORMAL
EXT CO2: 28 MMOL/L (ref 21–31)
EXT CREATININE UA: ABNORMAL
EXT CREATININE: 0.93 MG/DL (ref 0.8–1.3)
EXT CYCLOSPORONE LEVEL: ABNORMAL
EXT DOPAMINE: ABNORMAL
EXT EBV DNA BY PCR: ABNORMAL
EXT EPINEPHRINE: ABNORMAL
EXT FOLATE: ABNORMAL
EXT FREE T3: ABNORMAL
EXT FREE T4: ABNORMAL
EXT FSH: ABNORMAL
EXT GASTRIN RELEASING PEPTIDE: ABNORMAL
EXT GASTRIN RELEASING PEPTIDE: ABNORMAL
EXT GASTRIN: ABNORMAL
EXT GGT: ABNORMAL
EXT GHRELIN: ABNORMAL
EXT GLUCAGON: ABNORMAL
EXT GLUCOSE: 220 MG/DL (ref 74–100)
EXT GROWTH HORMONE: ABNORMAL
EXT HCV RNA QUANT PCR: ABNORMAL
EXT HDL: ABNORMAL
EXT HEMATOCRIT: 38.3 % (ref 40–54)
EXT HEMOGLOBIN A1C: ABNORMAL
EXT HEMOGLOBIN: 12.8 G/DL (ref 14–18)
EXT HISTAMINE 24 HR URINE: ABNORMAL
EXT HISTAMINE: ABNORMAL
EXT IGF-1: ABNORMAL
EXT IMMUNKNOW (NON-STIMULATED): ABNORMAL
EXT IMMUNKNOW (STIMULATED): ABNORMAL
EXT INR: ABNORMAL
EXT INSULIN: ABNORMAL
EXT LANREOTIDE LEVEL: ABNORMAL
EXT LDH, TOTAL: ABNORMAL
EXT LDL CHOLESTEROL: ABNORMAL
EXT LIPASE: ABNORMAL
EXT MAGNESIUM: ABNORMAL
EXT METANEPHRINE FREE PLASMA: ABNORMAL
EXT MOTILIN: ABNORMAL
EXT NEUROKININ A CAMB: ABNORMAL
EXT NEUROKININ A ISI: ABNORMAL
EXT NEUROTENSIN: ABNORMAL
EXT NOREPINEPHRINE: ABNORMAL
EXT NORMETANEPHRINE: ABNORMAL
EXT NSE: ABNORMAL
EXT OCTREOTIDE LEVEL: ABNORMAL
EXT PANCREASTATIN CAMB: ABNORMAL
EXT PANCREASTATIN ISI: ABNORMAL
EXT PANCREATIC POLYPEPTIDE: ABNORMAL
EXT PHOSPHORUS: ABNORMAL
EXT PLATELETS: 99 (ref 145–450)
EXT POTASSIUM: 3.8 MMOL/L (ref 3.5–5.5)
EXT PROGRAF LEVEL: ABNORMAL
EXT PROLACTIN: ABNORMAL
EXT PROTEIN TOTAL: 7.8 GM/DL (ref 6.1–7.9)
EXT PROTEIN UA: ABNORMAL
EXT PT: ABNORMAL
EXT PTH, INTACT: ABNORMAL
EXT PTT: ABNORMAL
EXT RAPAMUNE LEVEL: ABNORMAL
EXT SEROTONIN: ABNORMAL
EXT SODIUM: 140 MMOL/L (ref 136–145)
EXT SOMATOSTATIN: ABNORMAL
EXT SUBSTANCE P: ABNORMAL
EXT TRIGLYCERIDES: ABNORMAL
EXT TRYPTASE: ABNORMAL
EXT TSH: ABNORMAL
EXT URIC ACID: ABNORMAL
EXT URINE AMYLASE U/HR: ABNORMAL
EXT URINE AMYLASE U/L: ABNORMAL
EXT VASOACTIVE INTESTINAL POLYPEPTIDE: ABNORMAL
EXT VITAMIN B12: ABNORMAL
EXT VMA 24 HR URINE: ABNORMAL
EXT WBC: 3.6 (ref 4–11)
NEURON SPECIFIC ENOLASE: ABNORMAL

## 2018-03-15 ENCOUNTER — PATIENT MESSAGE (OUTPATIENT)
Dept: NEUROLOGY | Facility: HOSPITAL | Age: 63
End: 2018-03-15

## 2018-03-15 ENCOUNTER — TELEPHONE (OUTPATIENT)
Dept: NEUROLOGY | Facility: HOSPITAL | Age: 63
End: 2018-03-15

## 2018-03-15 DIAGNOSIS — C7B.8 SECONDARY NEUROENDOCRINE TUMOR OF LIVER: Primary | ICD-10-CM

## 2018-03-15 NOTE — TELEPHONE ENCOUNTER
----- Message from Madiha Desir sent at 3/14/2018  9:58 AM CDT -----  Contact: Patient  Monica Simms called back to see if Dr hammer has done the peer to peer. He is getting very concerned that it wont be approved before Next Monday. Call back number . Patient is coming from [place]. Patient would like a call back today with an answer.

## 2018-03-15 NOTE — TELEPHONE ENCOUNTER
----- Message from Sugar Vargas sent at 3/15/2018  9:47 AM CDT -----  Contact: Patient  RR:  Patient called, he and his wife are concerned about the Gallium outcome and what to do.  They would like a CT if they are not able to do the Gallium.  Mr. Simms can be reached at [phone].  Thank you  abc

## 2018-03-15 NOTE — TELEPHONE ENCOUNTER
Spoke with patient and wife and the GA68 was denied by the secondary insurance [employer] so they decided to cancel.

## 2018-03-15 NOTE — TELEPHONE ENCOUNTER
Spoke to patient's wife who expressed her frustration that no peer to peer has been done for her 's Ga68 at this time.  She states that she has called about this already.  I do not have a call that was sent to me nor any documentation I the chart that the patient called about the authorization.  Will see if anyone in the office will do a peer to peer     Scheduled CT scan in anticipation of the Ga68 not being approved in time.

## 2018-03-19 ENCOUNTER — HOSPITAL ENCOUNTER (OUTPATIENT)
Dept: RADIOLOGY | Facility: HOSPITAL | Age: 63
Discharge: HOME | End: 2018-03-19
Attending: INTERNAL MEDICINE
Payer: MEDICARE

## 2018-03-19 ENCOUNTER — CONFERENCE (OUTPATIENT)
Dept: NEUROLOGY | Facility: HOSPITAL | Age: 63
End: 2018-03-19

## 2018-03-19 DIAGNOSIS — C7B.8 SECONDARY NEUROENDOCRINE TUMOR OF LIVER: ICD-10-CM

## 2018-03-19 LAB
EXT 24 HR UR METANEPHRINE: ABNORMAL
EXT 24 HR UR NORMETANEPHRINE: ABNORMAL
EXT 24 HR UR NORMETANEPHRINE: ABNORMAL
EXT 25 HYDROXY VIT D2: ABNORMAL
EXT 25 HYDROXY VIT D3: ABNORMAL
EXT 5 HIAA 24 HR URINE: ABNORMAL
EXT 5 HIAA BLOOD: 240 NG/ML (ref 0–22)
EXT ACTH: ABNORMAL
EXT AFP: ABNORMAL
EXT ALBUMIN: ABNORMAL
EXT ALKALINE PHOSPHATASE: ABNORMAL
EXT ALT: ABNORMAL
EXT AMYLASE: ABNORMAL
EXT ANTI ISLET CELL AB: ABNORMAL
EXT ANTI PARIETAL CELL AB: ABNORMAL
EXT ANTI THYROID AB: ABNORMAL
EXT AST: ABNORMAL
EXT BILIRUBIN DIRECT: ABNORMAL MG/DL
EXT BILIRUBIN TOTAL: ABNORMAL
EXT BK VIRUS DNA QN PCR: ABNORMAL
EXT BUN: ABNORMAL
EXT C PEPTIDE: ABNORMAL
EXT CA 125: ABNORMAL
EXT CA 19-9: ABNORMAL
EXT CA 27-29: ABNORMAL
EXT CALCITONIN: ABNORMAL
EXT CALCIUM: ABNORMAL
EXT CEA: ABNORMAL
EXT CHLORIDE: ABNORMAL
EXT CHOLESTEROL: ABNORMAL
EXT CHROMOGRANIN A: ABNORMAL
EXT CO2: ABNORMAL
EXT CREATININE UA: ABNORMAL
EXT CREATININE: ABNORMAL MG/DL
EXT CYCLOSPORONE LEVEL: ABNORMAL
EXT DOPAMINE: ABNORMAL
EXT EBV DNA BY PCR: ABNORMAL
EXT EPINEPHRINE: ABNORMAL
EXT FOLATE: ABNORMAL
EXT FREE T3: ABNORMAL
EXT FREE T4: ABNORMAL
EXT FSH: ABNORMAL
EXT GASTRIN RELEASING PEPTIDE: ABNORMAL
EXT GASTRIN RELEASING PEPTIDE: ABNORMAL
EXT GASTRIN: ABNORMAL
EXT GGT: ABNORMAL
EXT GHRELIN: ABNORMAL
EXT GLUCAGON: ABNORMAL
EXT GLUCOSE: ABNORMAL
EXT GROWTH HORMONE: ABNORMAL
EXT HCV RNA QUANT PCR: ABNORMAL
EXT HDL: ABNORMAL
EXT HEMATOCRIT: ABNORMAL
EXT HEMOGLOBIN A1C: ABNORMAL
EXT HEMOGLOBIN: ABNORMAL
EXT HISTAMINE 24 HR URINE: ABNORMAL
EXT HISTAMINE: ABNORMAL
EXT IGF-1: ABNORMAL
EXT IMMUNKNOW (NON-STIMULATED): ABNORMAL
EXT IMMUNKNOW (STIMULATED): ABNORMAL
EXT INR: ABNORMAL
EXT INSULIN: ABNORMAL
EXT LANREOTIDE LEVEL: ABNORMAL
EXT LDH, TOTAL: ABNORMAL
EXT LDL CHOLESTEROL: ABNORMAL
EXT LIPASE: ABNORMAL
EXT MAGNESIUM: ABNORMAL
EXT METANEPHRINE FREE PLASMA: ABNORMAL
EXT MOTILIN: ABNORMAL
EXT NEUROKININ A CAMB: ABNORMAL
EXT NEUROKININ A ISI: <10 PG/ML (ref 0–40)
EXT NEUROTENSIN: ABNORMAL
EXT NOREPINEPHRINE: ABNORMAL
EXT NORMETANEPHRINE: ABNORMAL
EXT NSE: ABNORMAL
EXT OCTREOTIDE LEVEL: ABNORMAL
EXT PANCREASTATIN CAMB: ABNORMAL
EXT PANCREASTATIN ISI: 410 PG/ML (ref 10–135)
EXT PANCREATIC POLYPEPTIDE: ABNORMAL
EXT PHOSPHORUS: ABNORMAL
EXT PLATELETS: ABNORMAL
EXT POTASSIUM: ABNORMAL
EXT PROGRAF LEVEL: ABNORMAL
EXT PROLACTIN: ABNORMAL
EXT PROTEIN TOTAL: ABNORMAL
EXT PROTEIN UA: ABNORMAL
EXT PT: ABNORMAL
EXT PTH, INTACT: ABNORMAL
EXT PTT: ABNORMAL
EXT RAPAMUNE LEVEL: ABNORMAL
EXT SEROTONIN: 659 NG/ML (ref 56–244)
EXT SODIUM: ABNORMAL MMOL/L
EXT SOMATOSTATIN: ABNORMAL
EXT SUBSTANCE P: ABNORMAL
EXT TRIGLYCERIDES: ABNORMAL
EXT TRYPTASE: ABNORMAL
EXT TSH: ABNORMAL
EXT URIC ACID: ABNORMAL
EXT URINE AMYLASE U/HR: ABNORMAL
EXT URINE AMYLASE U/L: ABNORMAL
EXT VASOACTIVE INTESTINAL POLYPEPTIDE: ABNORMAL
EXT VITAMIN B12: ABNORMAL
EXT VMA 24 HR URINE: ABNORMAL
EXT WBC: ABNORMAL
NEURON SPECIFIC ENOLASE: ABNORMAL

## 2018-03-19 PROCEDURE — 74177 CT ABD & PELVIS W/CONTRAST: CPT | Mod: TC

## 2018-03-19 PROCEDURE — 74177 CT ABD & PELVIS W/CONTRAST: CPT | Mod: 26,,, | Performed by: RADIOLOGY

## 2018-03-19 PROCEDURE — 25500020 PHARM REV CODE 255: Performed by: INTERNAL MEDICINE

## 2018-03-19 RX ADMIN — IOHEXOL 30 ML: 350 INJECTION, SOLUTION INTRAVENOUS at 12:03

## 2018-03-19 RX ADMIN — IOHEXOL 100 ML: 350 INJECTION, SOLUTION INTRAVENOUS at 02:03

## 2018-03-19 NOTE — TELEPHONE ENCOUNTER
[facility]      NEUROENDOCRINE TUMOR MULTIDISCIPLINARY TUMOR BOARD  _____________________________________________________________________    PRESENTER:   Tereso Yuen DO    REASON FOR PRESENTATION:  Treatment Plan and Scan Review    ATTENDEES:   Surgery:              MD WILLIAM Conde MD T. 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    Secondary neuroendocrine tumor of liver     Secondary neuroendocrine tumor of other sites     pancreas       Past Surgical History:   Procedure Laterality Date    APPENDECTOMY      CEREBRAL ANEURYSM REPAIR      clipped-NO MRI    right hepatic partial lobectomy, t non anatomic tumors removed, RFA right lobe, cholecystectomy, excision of pancreatic tumor, lysis of adhesions,  2/25/2009- [facility]    small bowel resection, lymph node resection, 21/38 positive, right colectomy, lymphovascular, perineural invasion  10/2000    TACE  11/2016    Y-90 microspheres  July 2012-[facility]     ________________________________________________________________    DISCUSSION:  Diagnosis:   1. Malignant carcinoid tumor of the ileum    2. Secondary malignant carcinoid tumor of liver    3. Secondary neuroendocrine tumor of bone          Chief Complaint: Follow-up (post TACE)        Oncologic History:       Oncologic History Small intestine neuroendocrine tumor diagnosed 10/2000  Recurrent disease to liver 9/2008  Recurrent disease to bone 2015    Oncologic Treatment Small bowel resection,  10/2000  Cytoreduction 2/2009 ([facility])  Sandostatin 2/2012  Radioembolization 5/2015  Salcha embolization 7/2015, 8/2015  Lanreotide 8/2015  CAPTEM 7/2016 - 9/2016  TACE 11/2016  CAPTEM 11/2016  TACE 3/2107  TACE 9/2017    Pathology 10/2000 well-differentiated  2/2008 Ki-67 less than 1%      Gallium scan denied, CT done instead, disease burden looks stable, can not have MRI due to metal in body, has had 7 chemo embos, bili is 1.5,       BOARD RECOMMENDATIONS:   Repeat imaging in 6 months with CT  Dr Yuen will see him in clinic today to discuss

## 2018-03-20 ENCOUNTER — OFFICE VISIT (OUTPATIENT)
Dept: NEUROLOGY | Facility: HOSPITAL | Age: 63
End: 2018-03-20
Attending: INTERNAL MEDICINE
Payer: MEDICARE

## 2018-03-20 VITALS
TEMPERATURE: 98 F | OXYGEN SATURATION: 98 % | HEART RATE: 78 BPM | WEIGHT: 205.25 LBS | DIASTOLIC BLOOD PRESSURE: 75 MMHG | BODY MASS INDEX: 31.11 KG/M2 | HEIGHT: 68 IN | SYSTOLIC BLOOD PRESSURE: 124 MMHG | RESPIRATION RATE: 18 BRPM

## 2018-03-20 DIAGNOSIS — C7A.012 MALIGNANT CARCINOID TUMOR OF THE ILEUM: Primary | ICD-10-CM

## 2018-03-20 DIAGNOSIS — C7B.8 SECONDARY NEUROENDOCRINE TUMOR OF LIVER: ICD-10-CM

## 2018-03-20 DIAGNOSIS — C7B.8 SECONDARY NEUROENDOCRINE TUMOR OF BONE: ICD-10-CM

## 2018-03-20 DIAGNOSIS — C7B.8 SECONDARY NEUROENDOCRINE TUMOR OF DISTANT LYMPH NODES: ICD-10-CM

## 2018-03-20 PROCEDURE — 99214 OFFICE O/P EST MOD 30 MIN: CPT | Mod: ,,, | Performed by: INTERNAL MEDICINE

## 2018-03-20 PROCEDURE — 99215 OFFICE O/P EST HI 40 MIN: CPT | Performed by: INTERNAL MEDICINE

## 2018-03-20 NOTE — PROGRESS NOTES
NOLANETS:  [facility]  A collaboration between [facility] and [facility]    PATIENT: Tereso Simms  MRN: [id number]  DATE: 3/20/2018      Diagnosis:   1. Malignant carcinoid tumor of the ileum    2. Secondary neuroendocrine tumor of distant lymph nodes    3. Secondary neuroendocrine tumor of liver    4. Secondary neuroendocrine tumor of bone        Chief Complaint: Carcinoid Tumor      Oncologic History:      Oncologic History Small intestine neuroendocrine tumor diagnosed 10/2000  Recurrent disease to liver 9/2008  Recurrent disease to bone 2015    Oncologic Treatment Small bowel resection, 10/2000  Cytoreduction 2/2009 ([place])  Sandostatin 2/2012  Radioembolization 5/2015  [place] embolization 7/2015, 8/2015  Lanreotide 8/2015  CAPTEM 7/2016 - 9/2016  TACE 11/2016  CAPTEM 11/2016  TACE 3/2107  TACE 9/2017    Pathology 10/2000 well-differentiated  2/2008 Ki-67 less than 1%          Subjective:    Interval History: Mr. Smims is a 62 y.o. male who is seen  in follow-up for a small intestine neuroendocrine tumor.  He states he's been feeling well.  He remains on treatment with CAPTEM.  He has recently retired and has been traveling.  His weight has been stable.  He is fully active.  He is without new complaints.    He has previously seen Jimenez Nava and Malaika.  His history dates to 10/2000 when he was diagnosed with a terminal ileal  Carcinoid tumor.  He underwent a small bowel resection in 10/2000 with pathology revealing multiple carcinoid tumors with some largest measuring 4 cm. 21 out of 38 lymph nodes were positive for tumor.  There was no evidence of distant disease at that time. He did well with surveillance until 2008 when an octreotide scan had shown uptake in the right lobe of the liver. He underwent cytoreduction by Dr. Urban with pathology showing a neuroendocrine tumor with Ki-67 less than 1%. He  was initiated on Sandostatin in 2/2012.  In 2015 he underwent radioembolization as well as bland embolization. He also was initiated on Lanreotide.  He was initiated on CAPTEM in 7/2016.  He underwent additional TACE in 11/2016, 3/2017 and 9/2017.      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    Secondary neuroendocrine tumor of liver     Secondary neuroendocrine tumor of other sites     pancreas       Past Surgical HIstory:   Past Surgical History:   Procedure Laterality Date    APPENDECTOMY      CEREBRAL ANEURYSM REPAIR      clipped-NO MRI    right hepatic partial lobectomy, t non anatomic tumors removed, RFA right lobe, cholecystectomy, excision of pancreatic tumor, lysis of adhesions,  2/25/2009- [facility]    small bowel resection, lymph node resection, 21/38 positive, right colectomy, lymphovascular, perineural invasion  10/2000    TACE  11/2016    Y-90 microspheres  July 2012-[place]       Family History:   Family History   Problem Relation Age of Onset    Heart failure Father     Diabetes Father     COPD Mother     Cancer Mother      RCC    Cancer Sister      breast       Social History:  reports that he has never smoked. He has never used smokeless tobacco. He reports that he drinks alcohol. He reports that he does not use drugs.    Allergies:  Review of patient's allergies indicates:   Allergen Reactions    Epinephrine Other (See Comments)     Precipitates a carcinoid crisis       Medications:  Current Outpatient Prescriptions   Medication Sig Dispense Refill    ascorbic acid, vitamin C, (VITAMIN C) 1000 MG tablet Take 1,000 mg by mouth once daily.      calcium carbonate (TUMS) 200 mg calcium (500 mg) chewable tablet Take 1 tablet by mouth once daily.      capecitabine (XELODA) 500 MG Tab Take 500 mg by mouth 2  (two) times daily. Pt ttake 1650mg in am/ 1500mg at dinner -- 14 days on, 14 days off      cholecalciferol, vitamin D3, 2,000 unit Tab 1 TABLET DAILY      lanreotide (SOMATULINE DEPOT) 120 mg/0.5 mL Syrg Inject 120 mg into the skin every 30 days.      lisinopril (PRINIVIL,ZESTRIL) 5 MG tablet Take 5 mg by mouth once daily.      loperamide (IMODIUM) 1 mg/5 mL solution Take by mouth 2 (two) times daily.      ondansetron (ZOFRAN) 4 MG tablet Take 1 tablet (4 mg total) by mouth every 6 (six) hours as needed for Nausea. 40 tablet 0    ondansetron (ZOFRAN-ODT) 4 MG TbDL Take 1 tablet (4 mg total) by mouth every 6 (six) hours as needed. 25 tablet 0    sitagliptan-metformin (JANUMET) 50-1,000 mg per tablet Take 1 tablet by mouth 2 (two) times daily with meals.      temozolomide (TEMODAR) 100 MG capsule Take 300 mg/m2/day by mouth every evening. Pt take 300mg 5 days out of a month      vitamin D 1000 units Tab Take 185 mg by mouth once daily.      ZOLEDRONIC ACID (ZOMETA IV) Inject into the vein every 3 (three) months.        No current facility-administered medications for this visit.        Review of Systems   Constitutional: Negative for appetite change, chills, fatigue, fever and unexpected weight change.   HENT: Negative for dental problem, sinus pressure and sneezing.    Eyes: Negative for visual disturbance.   Respiratory: Negative for cough, choking and chest tightness.    Cardiovascular: Negative for chest pain and leg swelling.   Gastrointestinal: Negative for abdominal pain, blood in stool, constipation, diarrhea and nausea.   Genitourinary: Negative for difficulty urinating and dysuria.   Musculoskeletal: Negative for arthralgias and back pain.   Skin: Negative for rash and wound.   Neurological: Negative for dizziness, light-headedness and headaches.   Hematological: Negative for adenopathy. Does not bruise/bleed easily.   Psychiatric/Behavioral: Negative for sleep disturbance. The patient is not  "nervous/anxious.        ECOG Performance Status: 0   Objective:      Vitals:   Vitals:    03/20/18 1130   BP: 124/75   Pulse: 78   Resp: 18   Temp: 98.3 °F (36.8 °C)   TempSrc: Oral   SpO2: 98%   Weight: 93.1 kg (205 lb 4 oz)   Height: 5' 8" (1.727 m)     BMI: Body mass index is 31.21 kg/m².    Physical Exam   Constitutional: He is oriented to person, place, and time. He appears well-developed and well-nourished.   HENT:   Head: Normocephalic and atraumatic.   Eyes: Pupils are equal, round, and reactive to light.   Neck: Normal range of motion. Neck supple.   Cardiovascular: Normal rate and regular rhythm.    Pulmonary/Chest: Effort normal and breath sounds normal. No respiratory distress.   Abdominal: Soft. He exhibits no distension. There is no tenderness.   Musculoskeletal: He exhibits no edema or tenderness.   Lymphadenopathy:     He has no cervical adenopathy.   Neurological: He is alert and oriented to person, place, and time. No cranial nerve deficit.   Skin: Skin is warm and dry.   Psychiatric: He has a normal mood and affect. His behavior is normal.       Laboratory Data:  Abstract on 03/20/2018   No results displayed because visit has over 200 results.      Lab Visit on 03/19/2018   Component Date Value Ref Range Status    Sodium 03/19/2018 132* 136 - 145 mmol/L Final    Potassium 03/19/2018 4.2  3.5 - 5.1 mmol/L Final    Chloride 03/19/2018 104  95 - 110 mmol/L Final    CO2 03/19/2018 26  23 - 29 mmol/L Final    Glucose 03/19/2018 316* 70 - 110 mg/dL Final    BUN, Bld 03/19/2018 24* 8 - 23 mg/dL Final    Creatinine 03/19/2018 1.2  0.5 - 1.4 mg/dL Final    Calcium 03/19/2018 9.4  8.7 - 10.5 mg/dL Final    Total Protein 03/19/2018 8.9* 6.0 - 8.4 g/dL Final    Albumin 03/19/2018 3.7  3.5 - 5.2 g/dL Final    Total Bilirubin 03/19/2018 1.5* 0.1 - 1.0 mg/dL Final    Comment: For infants and newborns, interpretation of results should be based  on gestational age, weight and in agreement with " clinical  observations.  Premature Infant recommended reference ranges:  Up to 24 hours.............<8.0 mg/dL  Up to 48 hours............<12.0 mg/dL  3-5 days..................<15.0 mg/dL  6-29 days.................<15.0 mg/dL      Alkaline Phosphatase 03/19/2018 184* 55 - 135 U/L Final    AST 03/19/2018 43* 10 - 40 U/L Final    ALT 03/19/2018 43  10 - 44 U/L Final    Anion Gap 03/19/2018 2* 8 - 16 mmol/L Final    eGFR if African American 03/19/2018 >60  >60 mL/min/1.73 m^2 Final    eGFR if non African American 03/19/2018 >60  >60 mL/min/1.73 m^2 Final    Comment: Calculation used to obtain the estimated glomerular filtration  rate (eGFR) is the CKD-EPI equation.       WBC 03/19/2018 4.75  3.90 - 12.70 K/uL Final    RBC 03/19/2018 3.93* 4.60 - 6.20 M/uL Final    Hemoglobin 03/19/2018 12.7* 14.0 - 18.0 g/dL Final    Hematocrit 03/19/2018 37.8* 40.0 - 54.0 % Final    MCV 03/19/2018 96  82 - 98 fL Final    MCH 03/19/2018 32.3* 27.0 - 31.0 pg Final    MCHC 03/19/2018 33.6  32.0 - 36.0 g/dL Final    RDW 03/19/2018 16.4* 11.5 - 14.5 % Final    Platelets 03/19/2018 103* 150 - 350 K/uL Final    MPV 03/19/2018 9.5  9.2 - 12.9 fL Final    Gran # (ANC) 03/19/2018 3.2  1.8 - 7.7 K/uL Final    Comment: The ANC is based on a white cell differential from an   automated cell counter. It has not been microscopically   reviewed for the presence of abnormal cells. Clinical   correlation is required.      Prothrombin Time 03/19/2018 10.7  9.0 - 12.5 sec Final    INR 03/19/2018 1.0  0.8 - 1.2 Final    Comment: Coumadin Therapy:  2.0 - 3.0 for INR for all indicators except mechanical heart valves  and antiphospholipid syndromes which should use 2.5 - 3.5.      Creatinine 03/19/2018 1.2  0.5 - 1.4 mg/dL Final    eGFR if African American 03/19/2018 >60  >60 mL/min/1.73 m^2 Final    eGFR if non African American 03/19/2018 >60  >60 mL/min/1.73 m^2 Final    Comment: Calculation used to obtain the estimated glomerular  filtration  rate (eGFR) is the CKD-EPI equation.       WBC 03/19/2018 4.75  3.90 - 12.70 K/uL Final    RBC 03/19/2018 3.93* 4.60 - 6.20 M/uL Final    Hemoglobin 03/19/2018 12.7* 14.0 - 18.0 g/dL Final    Hematocrit 03/19/2018 37.8* 40.0 - 54.0 % Final    MCV 03/19/2018 96  82 - 98 fL Final    MCH 03/19/2018 32.3* 27.0 - 31.0 pg Final    MCHC 03/19/2018 33.6  32.0 - 36.0 g/dL Final    RDW 03/19/2018 16.4* 11.5 - 14.5 % Final    Platelets 03/19/2018 103* 150 - 350 K/uL Final    MPV 03/19/2018 9.5  9.2 - 12.9 fL Final    Gran # (ANC) 03/19/2018 3.2  1.8 - 7.7 K/uL Final    Comment: The ANC is based on a white cell differential from an   automated cell counter. It has not been microscopically   reviewed for the presence of abnormal cells. Clinical   correlation is required.      Sodium 03/19/2018 132* 136 - 145 mmol/L Final    Potassium 03/19/2018 4.2  3.5 - 5.1 mmol/L Final    Chloride 03/19/2018 104  95 - 110 mmol/L Final    CO2 03/19/2018 26  23 - 29 mmol/L Final    Glucose 03/19/2018 316* 70 - 110 mg/dL Final    BUN, Bld 03/19/2018 24* 8 - 23 mg/dL Final    Creatinine 03/19/2018 1.2  0.5 - 1.4 mg/dL Final    Calcium 03/19/2018 9.4  8.7 - 10.5 mg/dL Final    Total Protein 03/19/2018 8.9* 6.0 - 8.4 g/dL Final    Albumin 03/19/2018 3.7  3.5 - 5.2 g/dL Final    Total Bilirubin 03/19/2018 1.5* 0.1 - 1.0 mg/dL Final    Comment: For infants and newborns, interpretation of results should be based  on gestational age, weight and in agreement with clinical  observations.  Premature Infant recommended reference ranges:  Up to 24 hours.............<8.0 mg/dL  Up to 48 hours............<12.0 mg/dL  3-5 days..................<15.0 mg/dL  6-29 days.................<15.0 mg/dL      Alkaline Phosphatase 03/19/2018 184* 55 - 135 U/L Final    AST 03/19/2018 43* 10 - 40 U/L Final    ALT 03/19/2018 43  10 - 44 U/L Final    Anion Gap 03/19/2018 2* 8 - 16 mmol/L Final    eGFR if African American 03/19/2018 >60   >60 mL/min/1.73 m^2 Final    eGFR if non African American 03/19/2018 >60  >60 mL/min/1.73 m^2 Final    Comment: Calculation used to obtain the estimated glomerular filtration  rate (eGFR) is the CKD-EPI equation.          Imaging:   CT 3/19/18  EXAMINATION:  CT ABDOMEN PELVIS WITH CONTRAST    CLINICAL HISTORY:  Secondary neuroendocrine tumor of liver; Other secondary neuroendocrine tumors    TECHNIQUE:  Low dose axial images, sagittal and coronal reformations were obtained from the lung bases to the pubic symphysis following the IV administration of 100 mL of Omnipaque 350 and the oral administration of 30 mL of Omnipaque 350.    COMPARISON:  10/30/2017.    FINDINGS:  Lung bases appear clear.  The heart is normal in size.  There are coronary calcifications.  No pericardial fluid.    The liver measures 12.6 cm.  There is lobulated contour of the liver, likely related to combination of prior resection and treatment.  There is a hypodense lesion in segment 3 that measures 1.6 cm today, unchanged from the prior study.  Additional cholesterol hypodense lesions along the tip of the liver is also similar to prior with an index lesion measuring 1.6 cm today, also unchanged from the prior study.  No definite evidence for new liver lesions.  The hepatic veins, portal vein and hepatic artery appear patent.  Patient underwent prior cholecystectomy.  The adrenal glands appear unremarkable.  There are several hypodense lesions in the spleen which are unchanged from prior.  Additionally, the spleen is also enlarged.  The pancreas demonstrates no acute finding.  There is mild dilation of the biliary tree which could be related to prior cholecystectomy.    The kidneys are normal in size and position.  There are several cortical defects that could relate to sequela from prior infection or infarct.  There is no evidence for solid renal masses.  A low-density lesion in the lower pole on the right is unchanged from prior and too small  to characterize.  There is excretion of contrast.  Urinary bladder is unremarkable.  Prostate gland appears normal in size.    The stomach and small bowel appear unremarkable, noting prior resection evidence by staple line in the mid abdomen.  The appendix is not visualized, likely surgically excised.  There is redundancy of the colon and a few scattered diverticuli, colon is otherwise unremarkable.  There is no evidence for bowel obstruction or inflammation.  There is no ascites or free air.    Aorta and branches are patent.  There is mild atherosclerosis but no aneurysm.  There is mild ectasia of the infrarenal abdominal aorta which measures 2.2 cm.  The IVC and iliac veins are patent.  Normal size retroperitoneal lymph nodes are noted.  There are several enlarged mesenteric lymph nodes which appear similar to the prior study.  There are bilateral fat containing inguinal hernias but no evidence for ventral hernia.  Injection granulomas noted in the buttocks bilaterally, otherwise subcutaneous soft tissues appear unremarkable.  There is no evidence for acute fracture or osseous destructive process.  There is evidence for sclerosis in the L1 vertebral body which appears unchanged from prior and an osseous metastatic lesion in this location cannot be excluded.  Overall appearance of the osseous structures is similar to that of prior study.   Impression       1. In this patient with a history of metastatic neuroendocrine tumor, there are stable size and number of liver lesions, mesenteric and retroperitoneal lymph nodes as well as sclerotic lesion in the L1 vertebral body concerning for metastatic disease.  If concern persists, a gallium 68 60 a PET-CT could be obtained for further characterization.  2. See body of report for details and incidental findings.  3.  RECIST SUMMARY:  Date of prior examination for comparison: 10/30/2017.  Lesion 1: Liver, segment 3.  1.6 5 cm. Series 26 image . Prior measurement 1.6  cm.  Lesion 2: Liver, segment 6 1.6.  1.6 cm. Series 5 image 29.  Prior measurement 1.6 cm.  Non target lesions: Appears stable in size and number.                Assessment:       1. Malignant carcinoid tumor of the ileum    2. Secondary neuroendocrine tumor of distant lymph nodes    3. Secondary neuroendocrine tumor of liver    4. Secondary neuroendocrine tumor of bone           Plan:     Mr. Simms continues to do well from an oncologic standpoint.  He was discussed in our multidisciplinary neuroendocrine tumor board this morning and his last CT was reviewed which should shown an overall disease burden appearing stable.  He did have a few residual lesions in the liver as well as mesenteric and retroperitoneal lymph nodes as well as a sclerotic lesion at L1 on which appeared unchanged.  We had tried to get a gallium-68 PET/CT on him, however, his insurance had denied this.  At this point I would recommend continuation of CAPTEM and Lanreotide with repeat imaging by CT and also a gallium-68 PET/CT, if possible, in another 3 months.  If this is not possible in octreotide scan may be helpful.  He may be a candidate for PRRT upon progression.  I will plan to see him back in another 6 months or sooner if needed.  All questions were answered and he is agreeable with this plan.    Tereso Yuen DO, FACP  Hematology & Oncology, [facility]  [address] [place] [address], [address]  [place]  [address]  ph. [phone]; [phone]  fax. [phone]    25 minutes were spent in coordination of patient's care, record review and counseling.  More than 50% of the time was face-to-face.

## 2018-03-20 NOTE — LETTER
March 20, 2018        MD Prema Kahn & Negrito Steele  [facility] [id number] [facility] [place] [address]  Phone: [phone]  Fax: [phone]   Patient: Tereso Simms   MR Number: [id number]   YOB: 1955   Date of Visit: 3/20/2018       Dear Dr. Ramires:    Thank you for referring Tereso Simms to me for evaluation. Attached you will find relevant portions of my assessment and plan of care.    If you have questions, please do not hesitate to call me. I look forward to following Tereso Simms along with you.    Sincerely,      Tereso Yuen, DO, FACP            CC  No Recipients    Enclosure

## 2018-04-02 ENCOUNTER — PATIENT MESSAGE (OUTPATIENT)
Dept: NEUROLOGY | Facility: HOSPITAL | Age: 63
End: 2018-04-02

## 2018-05-07 ENCOUNTER — TELEPHONE (OUTPATIENT)
Dept: NEUROLOGY | Facility: HOSPITAL | Age: 63
End: 2018-05-07

## 2018-05-07 NOTE — TELEPHONE ENCOUNTER
----- Message from Heidi Best sent at 5/7/2018 10:43 AM CDT -----  Contact: Sierra Monroy NP called to speak with Dr. Yuen about Pt   Callback#[phone]  Thank You  KYLE Best

## 2018-06-01 LAB

## 2018-07-30 LAB
EXT 24 HR UR METANEPHRINE: NORMAL
EXT 24 HR UR NORMETANEPHRINE: NORMAL
EXT 24 HR UR NORMETANEPHRINE: NORMAL
EXT 25 HYDROXY VIT D2: NORMAL
EXT 25 HYDROXY VIT D3: NORMAL
EXT 5 HIAA 24 HR URINE: NORMAL
EXT 5 HIAA BLOOD: NORMAL
EXT ACTH: NORMAL
EXT AFP: NORMAL
EXT ALBUMIN: 4.7
EXT ALKALINE PHOSPHATASE: 152
EXT ALT: 56
EXT AMYLASE: NORMAL
EXT ANTI ISLET CELL AB: NORMAL
EXT ANTI PARIETAL CELL AB: NORMAL
EXT ANTI THYROID AB: NORMAL
EXT AST: 70
EXT BILIRUBIN DIRECT: NORMAL
EXT BILIRUBIN TOTAL: 2.7
EXT BK VIRUS DNA QN PCR: NORMAL
EXT BUN: 26
EXT C PEPTIDE: NORMAL
EXT CA 125: NORMAL
EXT CA 19-9: NORMAL
EXT CA 27-29: NORMAL
EXT CALCITONIN: NORMAL
EXT CALCIUM: 9.1
EXT CEA: NORMAL
EXT CHLORIDE: 105
EXT CHOLESTEROL: NORMAL
EXT CHROMOGRANIN A: 7
EXT CO2: 24
EXT CREATININE UA: NORMAL
EXT CREATININE: 0.91 MG/DL
EXT CYCLOSPORONE LEVEL: NORMAL
EXT DOPAMINE: NORMAL
EXT EBV DNA BY PCR: NORMAL
EXT EPINEPHRINE: NORMAL
EXT FOLATE: NORMAL
EXT FREE T3: NORMAL
EXT FREE T4: NORMAL
EXT FSH: NORMAL
EXT GASTRIN RELEASING PEPTIDE: NORMAL
EXT GASTRIN RELEASING PEPTIDE: NORMAL
EXT GASTRIN: NORMAL
EXT GGT: NORMAL
EXT GHRELIN: NORMAL
EXT GLUCAGON: NORMAL
EXT GLUCOSE: 200
EXT GROWTH HORMONE: NORMAL
EXT HCV RNA QUANT PCR: NORMAL
EXT HDL: NORMAL
EXT HEMATOCRIT: 39.9
EXT HEMOGLOBIN A1C: NORMAL
EXT HEMOGLOBIN: 13.3
EXT HISTAMINE 24 HR URINE: NORMAL
EXT HISTAMINE: NORMAL
EXT IGF-1: NORMAL
EXT IMMUNKNOW (NON-STIMULATED): NORMAL
EXT IMMUNKNOW (STIMULATED): NORMAL
EXT INR: NORMAL
EXT INSULIN: NORMAL
EXT LANREOTIDE LEVEL: NORMAL
EXT LDH, TOTAL: NORMAL
EXT LDL CHOLESTEROL: NORMAL
EXT LIPASE: NORMAL
EXT MAGNESIUM: NORMAL
EXT METANEPHRINE FREE PLASMA: NORMAL
EXT MOTILIN: NORMAL
EXT NEUROKININ A CAMB: NORMAL
EXT NEUROKININ A ISI: NORMAL
EXT NEUROTENSIN: NORMAL
EXT NOREPINEPHRINE: NORMAL
EXT NORMETANEPHRINE: NORMAL
EXT NSE: NORMAL
EXT OCTREOTIDE LEVEL: NORMAL
EXT PANCREASTATIN CAMB: NORMAL
EXT PANCREASTATIN ISI: NORMAL
EXT PANCREATIC POLYPEPTIDE: NORMAL
EXT PHOSPHORUS: NORMAL
EXT PLATELETS: 123
EXT POTASSIUM: 4.4
EXT PROGRAF LEVEL: NORMAL
EXT PROLACTIN: NORMAL
EXT PROTEIN TOTAL: 9.8
EXT PROTEIN UA: NORMAL
EXT PT: NORMAL
EXT PTH, INTACT: NORMAL
EXT PTT: NORMAL
EXT RAPAMUNE LEVEL: NORMAL
EXT SEROTONIN: NORMAL
EXT SODIUM: 137 MMOL/L
EXT SOMATOSTATIN: NORMAL
EXT SUBSTANCE P: NORMAL
EXT TRIGLYCERIDES: NORMAL
EXT TRYPTASE: NORMAL
EXT TSH: NORMAL
EXT URIC ACID: NORMAL
EXT URINE AMYLASE U/HR: NORMAL
EXT URINE AMYLASE U/L: NORMAL
EXT VASOACTIVE INTESTINAL POLYPEPTIDE: NORMAL
EXT VITAMIN B12: NORMAL
EXT VMA 24 HR URINE: NORMAL
EXT WBC: 4.48
NEURON SPECIFIC ENOLASE: NORMAL

## 2018-08-02 ENCOUNTER — TELEPHONE (OUTPATIENT)
Dept: NEUROLOGY | Facility: HOSPITAL | Age: 63
End: 2018-08-02

## 2018-08-02 NOTE — TELEPHONE ENCOUNTER
"Patient called to say that his local oncologist said his recent scans showed "slight progression" and he wants to know the next steps.  He said he put the CD's in the mail 2 day air and we should get them next week.  I told him we would have to look at the scans prior to letting him know the next steps in treatment, if is changes.       "

## 2018-08-02 NOTE — TELEPHONE ENCOUNTER
----- Message from Jelly Choudhary sent at 8/2/2018  9:04 AM CDT -----  RR- Patient recently had his scans which he is sending here but his local doctor noticed progression and they would like to know what the next steps should be. Please call back to assist at [phone].

## 2018-08-13 ENCOUNTER — CONFERENCE (OUTPATIENT)
Dept: NEUROLOGY | Facility: HOSPITAL | Age: 63
End: 2018-08-13

## 2018-08-13 NOTE — TELEPHONE ENCOUNTER
[facility]      NEUROENDOCRINE TUMOR MULTIDISCIPLINARY TUMOR BOARD  _____________________________________________________________________    PRESENTER:   Tereso Yuen DO    REASON FOR PRESENTATION:  Treatment Plan and Scan Review    ATTENDEES:   Surgery:              MD WILLIAM Conde MD T. 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    Secondary neuroendocrine tumor of liver     Secondary neuroendocrine tumor of other sites     pancreas       Past Surgical History:   Procedure Laterality Date    APPENDECTOMY      CEREBRAL ANEURYSM REPAIR      clipped-NO MRI    right hepatic partial lobectomy, t non anatomic tumors removed, RFA right lobe, cholecystectomy, excision of pancreatic tumor, lysis of adhesions,  2/25/2009- [facility]    small bowel resection, lymph node resection, 21/38 positive, right colectomy, lymphovascular, perineural invasion  10/2000    TACE  11/2016    Y-90 microspheres  July 2012-[place]     ________________________________________________________________    DISCUSSION:  Found CT dated 10/30/2017 but not a comparison PET. On Ga-68 PET 7/30/18, widespread osseous mets which do not show up well on CT. A few areas of uptake in the right liver, most apparent at the right inferior tip of the liver, are anatomically similar in size when compared to scan dated 10/30/17. PET pos LLL nodule is slightly increased in size since CT 3/19/18 and 10/30/17, PET pos RML nodule is similar in size, OPAL lesion not well seen. New disease appears  mostly extrahepatic, consider PRRT.        BOARD RECOMMENDATIONS:     Most disease is extra-hepatic, LDT not best option at this point  Gallium does not reveal how long bone mets have been present, as they are not well imaged on CT  Liver stable, lung stable, continue current therapy (CAPTEM)  Repeat MRI and Gallium in 3 months  Appt in 3 months

## 2018-08-15 ENCOUNTER — TELEPHONE (OUTPATIENT)
Dept: NEUROLOGY | Facility: HOSPITAL | Age: 63
End: 2018-08-15

## 2018-08-15 NOTE — TELEPHONE ENCOUNTER
Spoke to patient, gave him tumor board recs, he reports his home oncologist is wanting to reduce his dose due to his bili being 2.7 at the end of his CAPTEM cycle.  I told him it would probably be good to get a repeat prior to him starting this next cycle, even if it is reduced, but he would like official recommendations form Dr. Yuen sent to Dr. Ramires.

## 2018-08-15 NOTE — TELEPHONE ENCOUNTER
Gave tumor board recs to patient.  He would like recommendations for CAPTEM reduction be sent to Dr. Ramires.  I have asked this to be placed in the chart by Dr. Yuen and what to do with is increase bili.

## 2018-08-15 NOTE — TELEPHONE ENCOUNTER
Phoned patient and discussed his bili being 2.7 and Dr Stone wanting to decrease his dose of captem.  Will forward to Dr Yuen for recommendations.

## 2018-08-15 NOTE — TELEPHONE ENCOUNTER
----- Message from Charley Witt sent at 8/15/2018  3:36 PM CDT -----  Contact: Pateint  RR-Patient is calling in regards to Dr Tatum wanting to lower the dose of his Captem. Patient states he would like a call back today because he has to order medication.   Call back number is [phone]

## 2018-08-15 NOTE — TELEPHONE ENCOUNTER
----- Message from Sugar Vargas sent at 8/15/2018  9:02 AM CDT -----  Contact: Patient  RR:  Patient states that his case was to be presented to tumor board yesterday and he will need to order his next dose of CapTem today to start it on Monday.  Before he does that, he wants to make sure that those recommendations did not change.  He can be reached at [phone].  Thank you  abc

## 2018-08-16 NOTE — PROGRESS NOTES
Patient sent labs noting bilirubin of 2.7.  Possibly related to treatment with CAPTEM versus liver disease.  If persistently elevated would recommend MRI/MRCP.  If felt to be related to CAPTEM then dose reduction would be next steps.

## 2018-08-28 ENCOUNTER — TELEPHONE (OUTPATIENT)
Dept: NEUROLOGY | Facility: HOSPITAL | Age: 63
End: 2018-08-28

## 2018-08-28 NOTE — TELEPHONE ENCOUNTER
Patient was recently hospitalized with a partial obstruction.  Per his CT, there was no tumors present, they think it was scar tissue.  He is sending his records and CT to our office. He is off of CAPTEM due to his bilirubin.  He states that he can not have an MRCP due to having staples in his head. We will get his labs when he sends his CD.

## 2018-08-28 NOTE — TELEPHONE ENCOUNTER
----- Message from Madiha Desir sent at 8/28/2018  8:17 AM CDT -----  Contact: Patient  RR- Patient was admitted to a hospital in [place] and would like to discuss findings that a ct abdomen. Patient's call back [phone].

## 2018-08-30 LAB
EXT 24 HR UR METANEPHRINE: ABNORMAL
EXT 24 HR UR NORMETANEPHRINE: ABNORMAL
EXT 24 HR UR NORMETANEPHRINE: ABNORMAL
EXT 25 HYDROXY VIT D2: ABNORMAL
EXT 25 HYDROXY VIT D3: ABNORMAL
EXT 5 HIAA 24 HR URINE: ABNORMAL
EXT 5 HIAA BLOOD: ABNORMAL
EXT ACTH: ABNORMAL
EXT AFP: ABNORMAL
EXT ALBUMIN: 4.1 G/DL (ref 3.5–5)
EXT ALKALINE PHOSPHATASE: 161 IU/L (ref 38–126)
EXT ALT: 84 IU/L (ref 11–66)
EXT AMYLASE: ABNORMAL
EXT ANTI ISLET CELL AB: ABNORMAL
EXT ANTI PARIETAL CELL AB: ABNORMAL
EXT ANTI THYROID AB: ABNORMAL
EXT AST: 51 IU/L (ref 15–46)
EXT BILIRUBIN DIRECT: ABNORMAL MG/DL
EXT BILIRUBIN TOTAL: 1.3 MG/DL (ref 0.2–1.3)
EXT BK VIRUS DNA QN PCR: ABNORMAL
EXT BUN: 38 MG/DL (ref 9–20)
EXT C PEPTIDE: ABNORMAL
EXT CA 125: ABNORMAL
EXT CA 19-9: ABNORMAL
EXT CA 27-29: ABNORMAL
EXT CALCITONIN: ABNORMAL
EXT CALCIUM: 9.3 MG/DL (ref 8.4–10.2)
EXT CEA: ABNORMAL
EXT CHLORIDE: 103 MEQ/L (ref 11–110)
EXT CHOLESTEROL: ABNORMAL
EXT CHROMOGRANIN A: ABNORMAL
EXT CO2: 26 MEQ/L (ref 22–30)
EXT CREATININE UA: ABNORMAL
EXT CREATININE: 0.91 MG/DL (ref 0.66–1.25)
EXT CYCLOSPORONE LEVEL: ABNORMAL
EXT DOPAMINE: ABNORMAL
EXT EBV DNA BY PCR: ABNORMAL
EXT EPINEPHRINE: ABNORMAL
EXT FOLATE: ABNORMAL
EXT FREE T3: ABNORMAL
EXT FREE T4: ABNORMAL
EXT FSH: ABNORMAL
EXT GASTRIN RELEASING PEPTIDE: ABNORMAL
EXT GASTRIN RELEASING PEPTIDE: ABNORMAL
EXT GASTRIN: ABNORMAL
EXT GGT: ABNORMAL
EXT GHRELIN: ABNORMAL
EXT GLUCAGON: ABNORMAL
EXT GLUCOSE: 243 MG/DL (ref 75–125)
EXT GROWTH HORMONE: ABNORMAL
EXT HCV RNA QUANT PCR: ABNORMAL
EXT HDL: ABNORMAL
EXT HEMATOCRIT: 40.5 % (ref 38–52)
EXT HEMOGLOBIN A1C: ABNORMAL
EXT HEMOGLOBIN: 13.3 G/DL (ref 13.5–17.5)
EXT HISTAMINE 24 HR URINE: ABNORMAL
EXT HISTAMINE: ABNORMAL
EXT IGF-1: ABNORMAL
EXT IMMUNKNOW (NON-STIMULATED): ABNORMAL
EXT IMMUNKNOW (STIMULATED): ABNORMAL
EXT INR: ABNORMAL
EXT INSULIN: ABNORMAL
EXT LANREOTIDE LEVEL: ABNORMAL
EXT LDH, TOTAL: ABNORMAL
EXT LDL CHOLESTEROL: ABNORMAL
EXT LIPASE: ABNORMAL
EXT MAGNESIUM: ABNORMAL
EXT METANEPHRINE FREE PLASMA: ABNORMAL
EXT MOTILIN: ABNORMAL
EXT NEUROKININ A CAMB: ABNORMAL
EXT NEUROKININ A ISI: ABNORMAL
EXT NEUROTENSIN: ABNORMAL
EXT NOREPINEPHRINE: ABNORMAL
EXT NORMETANEPHRINE: ABNORMAL
EXT NSE: ABNORMAL
EXT OCTREOTIDE LEVEL: ABNORMAL
EXT PANCREASTATIN CAMB: ABNORMAL
EXT PANCREASTATIN ISI: ABNORMAL
EXT PANCREATIC POLYPEPTIDE: ABNORMAL
EXT PHOSPHORUS: ABNORMAL
EXT PLATELETS: 137 X10(9)/L (ref 150–450)
EXT POTASSIUM: 4.2 MEQ/L (ref 3.4–5)
EXT PROGRAF LEVEL: ABNORMAL
EXT PROLACTIN: ABNORMAL
EXT PROTEIN TOTAL: 8.2 G/DL (ref 6.3–8.2)
EXT PROTEIN UA: ABNORMAL
EXT PT: ABNORMAL
EXT PTH, INTACT: ABNORMAL
EXT PTT: ABNORMAL
EXT RAPAMUNE LEVEL: ABNORMAL
EXT SEROTONIN: ABNORMAL
EXT SODIUM: 137 MEQ/L (ref 137–145)
EXT SOMATOSTATIN: ABNORMAL
EXT SUBSTANCE P: ABNORMAL
EXT TRIGLYCERIDES: ABNORMAL
EXT TRYPTASE: ABNORMAL
EXT TSH: ABNORMAL
EXT URIC ACID: ABNORMAL
EXT URINE AMYLASE U/HR: ABNORMAL
EXT URINE AMYLASE U/L: ABNORMAL
EXT VASOACTIVE INTESTINAL POLYPEPTIDE: ABNORMAL
EXT VITAMIN B12: ABNORMAL
EXT VMA 24 HR URINE: ABNORMAL
EXT WBC: 7.69 X10(9)/L (ref 4–11)
NEURON SPECIFIC ENOLASE: ABNORMAL

## 2018-09-10 ENCOUNTER — CONFERENCE (OUTPATIENT)
Dept: NEUROLOGY | Facility: HOSPITAL | Age: 63
End: 2018-09-10

## 2018-09-10 NOTE — TELEPHONE ENCOUNTER
[facility]      NEUROENDOCRINE TUMOR MULTIDISCIPLINARY TUMOR BOARD  _____________________________________________________________________    PRESENTER:   Tereso Yuen DO    REASON FOR PRESENTATION:  Treatment Plan, Scan Review and Surgical Evaluation    ATTENDEES:   Surgery:              MD WILLIAM Conde MD T. 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    Secondary neuroendocrine tumor of liver     Secondary neuroendocrine tumor of other sites     pancreas       Past Surgical History:   Procedure Laterality Date    APPENDECTOMY      CEREBRAL ANEURYSM REPAIR      clipped-NO MRI    EMBOLIZATION N/A 3/10/2017    [facility]    EMBOLIZATION N/A 11/11/2016    [facility]    right hepatic partial lobectomy, t non anatomic tumors removed, RFA right lobe, cholecystectomy, excision of pancreatic tumor, lysis of adhesions,  2/25/2009- [facility]    small bowel resection, lymph node resection, 21/38 positive, right colectomy, lymphovascular, perineural invasion  10/2000    TACE  11/2016    Y-90 microspheres  July 2012-[facility]     ________________________________________________________________    DISCUSSION:  Images from scan 8/23/18 did not upload correctly. Only 1 image available. On Ga68 PET from 7/30/18 there was no evidence of bowel obstruction. Pt has extensive bone mets as well as liver, lung and lymph nodes.      BOARD RECOMMENDATIONS:      Follow up with local oncologist with her recommendations of DONAVON

## 2018-09-14 ENCOUNTER — TELEPHONE (OUTPATIENT)
Dept: NEUROLOGY | Facility: HOSPITAL | Age: 63
End: 2018-09-14

## 2018-09-14 NOTE — TELEPHONE ENCOUNTER
----- Message from Charley Witt sent at 9/14/2018 10:30 AM CDT -----  Contact: Patient  RR--Patient is calling in regards to Tumor Board Results.  Call back number is [phone]

## 2018-09-14 NOTE — TELEPHONE ENCOUNTER
Faxed tumor board recs to Dr. Stone, requested full images of Ct from [facility]

## 2018-11-16 ENCOUNTER — TELEPHONE (OUTPATIENT)
Dept: NEUROLOGY | Facility: HOSPITAL | Age: 63
End: 2018-11-16

## 2018-11-16 NOTE — TELEPHONE ENCOUNTER
----- Message from Jelly Choudhary sent at 11/16/2018 11:12 AM CST -----  RR- Patient had an obstruction in August and now they want to send him to a surgeon locally because the recent imaging is showing a tumor. Patients cd has been loaded in Epic and he would like for the doctor to look at it and see if he concurs with the referral to the surgeon. Please call back to assist at [phone].

## 2018-11-16 NOTE — TELEPHONE ENCOUNTER
Forwarding to Dr. Yuen and Dr. Urban for review. Returned pts call. No answer. LVM to return call. Awaiting call back.

## 2018-11-16 NOTE — TELEPHONE ENCOUNTER
Asked Dr. Urban to review images, states area in small bowel may be causing obstruction. Would be surgical candidate if he obstructs again.

## 2018-11-19 ENCOUNTER — TELEPHONE (OUTPATIENT)
Dept: NEUROLOGY | Facility: HOSPITAL | Age: 63
End: 2018-11-19

## 2018-11-19 NOTE — TELEPHONE ENCOUNTER
----- Message from Madiha Desir sent at 11/19/2018  8:11 AM CST -----  Contact: Patient  RR- patient was returning nurses call. # [phone] or [phone] second number perferbly.

## 2018-11-19 NOTE — TELEPHONE ENCOUNTER
Patient was recommended to have surgery for an area of previous SBO.  He is asking Dr. Yuen's thoughts and if it can wait until January.

## 2018-11-20 ENCOUNTER — CONFERENCE (OUTPATIENT)
Dept: NEUROLOGY | Facility: HOSPITAL | Age: 63
End: 2018-11-20

## 2018-11-20 NOTE — TELEPHONE ENCOUNTER
"Spoke to patient, surgeon at [place] told him no surgery, no "obstruction" seen. Will follow up with Gallium scan in January.     "

## 2018-11-20 NOTE — TELEPHONE ENCOUNTER
[facility]      NEUROENDOCRINE TUMOR MULTIDISCIPLINARY TUMOR BOARD  _____________________________________________________________________    PRESENTER:   Tereso Yuen DO    REASON FOR PRESENTATION:  Treatment Plan    ATTENDEES:   Surgery:              MD WILLIAM Conde MD T. 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    Secondary neuroendocrine tumor of liver     Secondary neuroendocrine tumor of other sites     pancreas       Past Surgical History:   Procedure Laterality Date    APPENDECTOMY      CEREBRAL ANEURYSM REPAIR      clipped-NO MRI    EMBOLIZATION N/A 3/10/2017    [facility]    EMBOLIZATION N/A 11/11/2016    [facility]    right hepatic partial lobectomy, t non anatomic tumors removed, RFA right lobe, cholecystectomy, excision of pancreatic tumor, lysis of adhesions,  2/25/2009- [facility] [place]    small bowel resection, lymph node resection, 21/38 positive, right colectomy, lymphovascular, perineural invasion  10/2000    TACE  11/2016    Y-90 microspheres  July 2012-[place]     ________________________________________________________________    DISCUSSION:  Had SBO in Sept 2018; new CT scan showed partial obst and local md is recommending surgery now.  Currently on lanreotide and captem.        BOARD RECOMMENDATIONS:     Agree with surgery, sooner rather than later.

## 2019-02-25 LAB
EXT 24 HR UR METANEPHRINE: ABNORMAL
EXT 24 HR UR NORMETANEPHRINE: ABNORMAL
EXT 24 HR UR NORMETANEPHRINE: ABNORMAL
EXT 25 HYDROXY VIT D2: ABNORMAL
EXT 25 HYDROXY VIT D3: ABNORMAL
EXT 5 HIAA 24 HR URINE: ABNORMAL
EXT 5 HIAA BLOOD: ABNORMAL
EXT ACTH: ABNORMAL
EXT AFP: ABNORMAL
EXT ALBUMIN: 4.3 G/DL (ref 3.5–5)
EXT ALKALINE PHOSPHATASE: 209 IU/L (ref 38–126)
EXT ALT: 33 IU/L (ref 11–66)
EXT AMYLASE: ABNORMAL
EXT ANTI ISLET CELL AB: ABNORMAL
EXT ANTI PARIETAL CELL AB: ABNORMAL
EXT ANTI THYROID AB: ABNORMAL
EXT AST: 60 IU/L (ref 15–46)
EXT BILIRUBIN DIRECT: ABNORMAL
EXT BILIRUBIN TOTAL: 2.1 MG/DL (ref 0.2–1.3)
EXT BK VIRUS DNA QN PCR: ABNORMAL
EXT BUN: 18 MG/DL (ref 9–20)
EXT C PEPTIDE: ABNORMAL
EXT CA 125: ABNORMAL
EXT CA 19-9: ABNORMAL
EXT CA 27-29: ABNORMAL
EXT CALCITONIN: ABNORMAL
EXT CALCIUM: 9.5 MG/DL (ref 8.4–10.2)
EXT CEA: ABNORMAL
EXT CHLORIDE: 103 MEQ/L (ref 100–110)
EXT CHOLESTEROL: ABNORMAL
EXT CHROMOGRANIN A: 8 NMOL/L (ref 0–5)
EXT CO2: 29 MEQ/L (ref 22–30)
EXT CREATININE UA: ABNORMAL
EXT CREATININE: 1 MG/DL (ref 0.66–1.25)
EXT CYCLOSPORONE LEVEL: ABNORMAL
EXT DOPAMINE: ABNORMAL
EXT EBV DNA BY PCR: ABNORMAL
EXT EPINEPHRINE: ABNORMAL
EXT FOLATE: ABNORMAL
EXT FREE T3: ABNORMAL
EXT FREE T4: ABNORMAL
EXT FSH: ABNORMAL
EXT GASTRIN RELEASING PEPTIDE: ABNORMAL
EXT GASTRIN RELEASING PEPTIDE: ABNORMAL
EXT GASTRIN: ABNORMAL
EXT GGT: ABNORMAL
EXT GHRELIN: ABNORMAL
EXT GLUCAGON: ABNORMAL
EXT GLUCOSE: 183 MG/DL (ref 75–125)
EXT GROWTH HORMONE: ABNORMAL
EXT HCV RNA QUANT PCR: ABNORMAL
EXT HDL: ABNORMAL
EXT HEMATOCRIT: 39.7 % (ref 38–52)
EXT HEMOGLOBIN A1C: ABNORMAL
EXT HEMOGLOBIN: 12.8 G/DL (ref 13.5–17.5)
EXT HISTAMINE 24 HR URINE: ABNORMAL
EXT HISTAMINE: ABNORMAL
EXT IGF-1: ABNORMAL
EXT IMMUNKNOW (NON-STIMULATED): ABNORMAL
EXT IMMUNKNOW (STIMULATED): ABNORMAL
EXT INR: ABNORMAL
EXT INSULIN: ABNORMAL
EXT LANREOTIDE LEVEL: ABNORMAL
EXT LDH, TOTAL: ABNORMAL
EXT LDL CHOLESTEROL: ABNORMAL
EXT LIPASE: ABNORMAL
EXT MAGNESIUM: ABNORMAL
EXT METANEPHRINE FREE PLASMA: ABNORMAL
EXT MOTILIN: ABNORMAL
EXT NEUROKININ A CAMB: ABNORMAL
EXT NEUROKININ A ISI: ABNORMAL
EXT NEUROTENSIN: ABNORMAL
EXT NOREPINEPHRINE: ABNORMAL
EXT NORMETANEPHRINE: ABNORMAL
EXT NSE: ABNORMAL
EXT OCTREOTIDE LEVEL: ABNORMAL
EXT PANCREASTATIN CAMB: ABNORMAL
EXT PANCREASTATIN ISI: ABNORMAL
EXT PANCREATIC POLYPEPTIDE: ABNORMAL
EXT PHOSPHORUS: ABNORMAL
EXT PLATELETS: 94 X10(9)/L (ref 150–450)
EXT POTASSIUM: 4.3 MEQ/L (ref 3.4–5)
EXT PROGRAF LEVEL: ABNORMAL
EXT PROLACTIN: ABNORMAL
EXT PROTEIN TOTAL: 9.6 G/DL (ref 6.3–8.2)
EXT PROTEIN UA: ABNORMAL
EXT PT: ABNORMAL
EXT PTH, INTACT: ABNORMAL
EXT PTT: ABNORMAL
EXT RAPAMUNE LEVEL: ABNORMAL
EXT SEROTONIN: 540 NG/ML (ref 21–321)
EXT SODIUM: 137 MEQ/L (ref 137–145)
EXT SOMATOSTATIN: ABNORMAL
EXT SUBSTANCE P: ABNORMAL
EXT TRIGLYCERIDES: ABNORMAL
EXT TRYPTASE: ABNORMAL
EXT TSH: ABNORMAL
EXT URIC ACID: ABNORMAL
EXT URINE AMYLASE U/HR: ABNORMAL
EXT URINE AMYLASE U/L: ABNORMAL
EXT VASOACTIVE INTESTINAL POLYPEPTIDE: ABNORMAL
EXT VITAMIN B12: ABNORMAL
EXT VMA 24 HR URINE: ABNORMAL
EXT WBC: 3.38 X10(9)/L (ref 4–11)
NEURON SPECIFIC ENOLASE: ABNORMAL

## 2019-03-07 ENCOUNTER — TELEPHONE (OUTPATIENT)
Dept: NEUROLOGY | Facility: HOSPITAL | Age: 64
End: 2019-03-07

## 2019-03-07 NOTE — TELEPHONE ENCOUNTER
Let patient know that we would discuss his case in tumor board and if there is no progression, Dr. Yuen would favor him holding CAPTEM for 3 months.

## 2019-03-07 NOTE — TELEPHONE ENCOUNTER
----- Message from Jelly Choudhary sent at 3/7/2019  9:53 AM CST -----  RR- Patient is calling to see if he is on for tumor board next week, his scans are in radiology now. Also, patients local Dr. Ramires would like to know if the patient will ever have a break from the Captem so his body can build up. Please call back to assist at [phone].

## 2019-03-13 ENCOUNTER — TELEPHONE (OUTPATIENT)
Dept: NEUROLOGY | Facility: HOSPITAL | Age: 64
End: 2019-03-13

## 2019-03-13 NOTE — TELEPHONE ENCOUNTER
----- Message from Charley Witt sent at 3/13/2019  8:48 AM CDT -----  Contact: Patient  RR- Patient is going out of town tomorrow and would like a call back today about his Captem because he will not have cell service. Call patient at [phone].

## 2019-03-13 NOTE — TELEPHONE ENCOUNTER
Let patient know that scans were requested, he will call [facility] to ask them to expedite.  Will represent when received.

## 2019-03-13 NOTE — TELEPHONE ENCOUNTER
----- Message from Heidi Best sent at 3/13/2019  2:34 PM CDT -----  Contact: pt   Pt called to speak with nurse Cris  have some questions   Callback [phone]  Thank You  KYLE Best

## 2019-03-19 ENCOUNTER — TELEPHONE (OUTPATIENT)
Dept: NEUROLOGY | Facility: HOSPITAL | Age: 64
End: 2019-03-19

## 2019-03-19 NOTE — TELEPHONE ENCOUNTER
----- Message from Sugar Vargas sent at 3/19/2019  9:09 AM CDT -----  Contact: Patient  RR:  Patient called to make sure we got copies of CT and GA68.  Assured him that CD's and reports were received and being reviewed.  He is scheduled to start his next CapTem on Monday and wants to make sure he knows whether or not he should.  Patient can be reached at [phone].  Thank you  abc

## 2019-03-19 NOTE — TELEPHONE ENCOUNTER
Noted Ga68 loaded, forwarded message to Dr. Enriquez, asking him to check to see if there is any significant change.

## 2019-03-22 ENCOUNTER — TELEPHONE (OUTPATIENT)
Dept: NEUROLOGY | Facility: HOSPITAL | Age: 64
End: 2019-03-22

## 2019-03-22 NOTE — TELEPHONE ENCOUNTER
----- Message from Kisha Kingston sent at 3/22/2019  8:10 AM CDT -----  Contact: [phone]  Patient requested an update on the previous message because he start his next CapTem on Monday and wants to make sure he knows whether or not he should. Please call.

## 2019-03-22 NOTE — TELEPHONE ENCOUNTER
Gave patient message that upon further review, there has been progression in osseous lesion, needs to make an appt to discuss treatment change.

## 2019-04-30 ENCOUNTER — TELEPHONE (OUTPATIENT)
Dept: NEUROLOGY | Facility: HOSPITAL | Age: 64
End: 2019-04-30

## 2019-04-30 LAB
EXT 24 HR UR METANEPHRINE: ABNORMAL
EXT 24 HR UR NORMETANEPHRINE: ABNORMAL
EXT 24 HR UR NORMETANEPHRINE: ABNORMAL
EXT 25 HYDROXY VIT D2: ABNORMAL
EXT 25 HYDROXY VIT D3: ABNORMAL
EXT 5 HIAA 24 HR URINE: ABNORMAL
EXT 5 HIAA BLOOD: ABNORMAL
EXT ACTH: ABNORMAL
EXT AFP: ABNORMAL
EXT ALBUMIN: 4.2 G/DL (ref 3.5–5)
EXT ALKALINE PHOSPHATASE: 295 IU/L (ref 38–126)
EXT ALT: 53 IU/L (ref 11–66)
EXT AMYLASE: ABNORMAL
EXT ANTI ISLET CELL AB: ABNORMAL
EXT ANTI PARIETAL CELL AB: ABNORMAL
EXT ANTI THYROID AB: ABNORMAL
EXT AST: 92 IU/L (ref 15–46)
EXT BILIRUBIN DIRECT: ABNORMAL
EXT BILIRUBIN TOTAL: 1.8 MG/DL (ref 0.2–1.3)
EXT BK VIRUS DNA QN PCR: ABNORMAL
EXT BUN: 21 MG/DL (ref 9–20)
EXT C PEPTIDE: ABNORMAL
EXT CA 125: ABNORMAL
EXT CA 19-9: ABNORMAL
EXT CA 27-29: ABNORMAL
EXT CALCITONIN: ABNORMAL
EXT CALCIUM: 9.6 MG/DL (ref 8.4–10.2)
EXT CEA: ABNORMAL
EXT CHLORIDE: 105 MEQ/L (ref 100–110)
EXT CHOLESTEROL: ABNORMAL
EXT CHROMOGRANIN A: ABNORMAL
EXT CO2: 28 MEQ/L (ref 22–40)
EXT CREATININE UA: ABNORMAL
EXT CREATININE: 0.83 MG/DL (ref 0.66–1.25)
EXT CYCLOSPORONE LEVEL: ABNORMAL
EXT DOPAMINE: ABNORMAL
EXT EBV DNA BY PCR: ABNORMAL
EXT EPINEPHRINE: ABNORMAL
EXT FOLATE: ABNORMAL
EXT FREE T3: ABNORMAL
EXT FREE T4: ABNORMAL
EXT FSH: ABNORMAL
EXT GASTRIN RELEASING PEPTIDE: ABNORMAL
EXT GASTRIN RELEASING PEPTIDE: ABNORMAL
EXT GASTRIN: ABNORMAL
EXT GGT: ABNORMAL
EXT GHRELIN: ABNORMAL
EXT GLUCAGON: ABNORMAL
EXT GLUCOSE: ABNORMAL
EXT GROWTH HORMONE: ABNORMAL
EXT HCV RNA QUANT PCR: ABNORMAL
EXT HDL: ABNORMAL
EXT HEMATOCRIT: 35.8 % (ref 38–52)
EXT HEMOGLOBIN A1C: ABNORMAL %
EXT HEMOGLOBIN: 11.1 G/DL (ref 13.5–17.5)
EXT HISTAMINE 24 HR URINE: ABNORMAL
EXT HISTAMINE: ABNORMAL
EXT IGF-1: ABNORMAL
EXT IMMUNKNOW (NON-STIMULATED): ABNORMAL
EXT IMMUNKNOW (STIMULATED): ABNORMAL
EXT INR: ABNORMAL
EXT INSULIN: ABNORMAL
EXT LANREOTIDE LEVEL: ABNORMAL
EXT LDH, TOTAL: ABNORMAL
EXT LDL CHOLESTEROL: ABNORMAL
EXT LIPASE: ABNORMAL
EXT MAGNESIUM: ABNORMAL
EXT METANEPHRINE FREE PLASMA: ABNORMAL
EXT MOTILIN: ABNORMAL
EXT NEUROKININ A CAMB: ABNORMAL
EXT NEUROKININ A ISI: ABNORMAL
EXT NEUROTENSIN: ABNORMAL
EXT NOREPINEPHRINE: ABNORMAL
EXT NORMETANEPHRINE: ABNORMAL
EXT NSE: ABNORMAL
EXT OCTREOTIDE LEVEL: ABNORMAL
EXT PANCREASTATIN CAMB: ABNORMAL
EXT PANCREASTATIN ISI: ABNORMAL
EXT PANCREATIC POLYPEPTIDE: ABNORMAL
EXT PHOSPHORUS: ABNORMAL
EXT PLATELETS: 106 X10(9)/L (ref 150–450)
EXT POTASSIUM: 4.6 MEQ/L (ref 3.4–5)
EXT PROGRAF LEVEL: ABNORMAL
EXT PROLACTIN: ABNORMAL
EXT PROTEIN TOTAL: 9 G/DL (ref 6.3–8.2)
EXT PROTEIN UA: ABNORMAL
EXT PT: ABNORMAL
EXT PTH, INTACT: ABNORMAL
EXT PTT: ABNORMAL
EXT RAPAMUNE LEVEL: ABNORMAL
EXT SEROTONIN: ABNORMAL
EXT SODIUM: 140 MMOL/L (ref 137–145)
EXT SOMATOSTATIN: ABNORMAL
EXT SUBSTANCE P: ABNORMAL
EXT TRIGLYCERIDES: ABNORMAL
EXT TRYPTASE: ABNORMAL
EXT TSH: ABNORMAL
EXT URIC ACID: ABNORMAL
EXT URINE AMYLASE U/HR: ABNORMAL
EXT URINE AMYLASE U/L: ABNORMAL
EXT VASOACTIVE INTESTINAL POLYPEPTIDE: ABNORMAL
EXT VITAMIN B12: ABNORMAL
EXT VMA 24 HR URINE: ABNORMAL
EXT WBC: 3.77 X10(9)/L (ref 4–11)
NEURON SPECIFIC ENOLASE: ABNORMAL

## 2019-04-30 NOTE — TELEPHONE ENCOUNTER
----- Message from Amparo Parada sent at 4/30/2019 11:34 AM CDT -----  Contact: [phone]  RR  Patient requesting to speak with you regarding his upcoming appt and which scans he will need. Please advise.

## 2019-06-28 LAB
EXT 24 HR UR METANEPHRINE: ABNORMAL
EXT 24 HR UR NORMETANEPHRINE: ABNORMAL
EXT 24 HR UR NORMETANEPHRINE: ABNORMAL
EXT 25 HYDROXY VIT D2: ABNORMAL
EXT 25 HYDROXY VIT D3: ABNORMAL
EXT 5 HIAA 24 HR URINE: ABNORMAL
EXT 5 HIAA BLOOD: ABNORMAL
EXT ACTH: ABNORMAL
EXT AFP: ABNORMAL
EXT ALBUMIN: 4.3 G/DL (ref 3.5–5)
EXT ALKALINE PHOSPHATASE: 269 IU/L (ref 38–126)
EXT ALT: 59 IU/L (ref 11–66)
EXT AMYLASE: ABNORMAL
EXT ANTI ISLET CELL AB: ABNORMAL
EXT ANTI PARIETAL CELL AB: ABNORMAL
EXT ANTI THYROID AB: ABNORMAL
EXT AST: 82 IU/L (ref 15–46)
EXT BILIRUBIN DIRECT: ABNORMAL
EXT BILIRUBIN TOTAL: 1.6 MG/DL (ref 0.2–1.3)
EXT BK VIRUS DNA QN PCR: ABNORMAL
EXT BUN: 23 MG/DL (ref 9–20)
EXT C PEPTIDE: ABNORMAL
EXT CA 125: ABNORMAL
EXT CA 19-9: ABNORMAL
EXT CA 27-29: ABNORMAL
EXT CALCITONIN: ABNORMAL
EXT CALCIUM: 9.1 MG/DL (ref 8.4–10.2)
EXT CEA: ABNORMAL
EXT CHLORIDE: 106 MEQ/L (ref 100–110)
EXT CHOLESTEROL: ABNORMAL
EXT CHROMOGRANIN A: 12 NMOL/L (ref 0–5)
EXT CO2: 27 MEQ/L (ref 22–30)
EXT CREATININE UA: ABNORMAL
EXT CREATININE: 1.01 MG/DL (ref 0.66–1.25)
EXT CYCLOSPORONE LEVEL: ABNORMAL
EXT DOPAMINE: ABNORMAL
EXT EBV DNA BY PCR: ABNORMAL
EXT EPINEPHRINE: ABNORMAL
EXT FOLATE: ABNORMAL
EXT FREE T3: ABNORMAL
EXT FREE T4: ABNORMAL
EXT FSH: ABNORMAL
EXT GASTRIN RELEASING PEPTIDE: ABNORMAL
EXT GASTRIN RELEASING PEPTIDE: ABNORMAL
EXT GASTRIN: ABNORMAL
EXT GGT: ABNORMAL
EXT GHRELIN: ABNORMAL
EXT GLUCAGON: ABNORMAL
EXT GLUCOSE: 208 MG/DL (ref 75–125)
EXT GROWTH HORMONE: ABNORMAL
EXT HCV RNA QUANT PCR: ABNORMAL
EXT HDL: ABNORMAL
EXT HEMATOCRIT: 36.2 % (ref 38–52)
EXT HEMOGLOBIN A1C: ABNORMAL %
EXT HEMOGLOBIN: 11 G/DL (ref 13.5–17.5)
EXT HISTAMINE 24 HR URINE: ABNORMAL
EXT HISTAMINE: ABNORMAL
EXT IGF-1: ABNORMAL
EXT IMMUNKNOW (NON-STIMULATED): ABNORMAL
EXT IMMUNKNOW (STIMULATED): ABNORMAL
EXT INR: ABNORMAL
EXT INSULIN: ABNORMAL
EXT LANREOTIDE LEVEL: ABNORMAL
EXT LDH, TOTAL: ABNORMAL
EXT LDL CHOLESTEROL: ABNORMAL
EXT LIPASE: ABNORMAL
EXT MAGNESIUM: ABNORMAL
EXT METANEPHRINE FREE PLASMA: ABNORMAL
EXT MOTILIN: ABNORMAL
EXT NEUROKININ A CAMB: ABNORMAL
EXT NEUROKININ A ISI: ABNORMAL
EXT NEUROTENSIN: ABNORMAL
EXT NOREPINEPHRINE: ABNORMAL
EXT NORMETANEPHRINE: ABNORMAL
EXT NSE: ABNORMAL
EXT OCTREOTIDE LEVEL: ABNORMAL
EXT PANCREASTATIN CAMB: ABNORMAL
EXT PANCREASTATIN ISI: ABNORMAL
EXT PANCREATIC POLYPEPTIDE: ABNORMAL
EXT PHOSPHORUS: ABNORMAL
EXT PLATELETS: 111 X10(9)/L (ref 150–450)
EXT POTASSIUM: 4.6 MEQ/L (ref 3.4–5)
EXT PROGRAF LEVEL: ABNORMAL
EXT PROLACTIN: ABNORMAL
EXT PROTEIN TOTAL: 9.3 G/DL (ref 6.3–8.2)
EXT PROTEIN UA: ABNORMAL
EXT PT: ABNORMAL
EXT PTH, INTACT: ABNORMAL
EXT PTT: ABNORMAL
EXT RAPAMUNE LEVEL: ABNORMAL
EXT SEROTONIN: 700 NG/ML (ref 21–321)
EXT SODIUM: 138 MEQ/L (ref 137–145)
EXT SOMATOSTATIN: ABNORMAL
EXT SUBSTANCE P: ABNORMAL
EXT TRIGLYCERIDES: ABNORMAL
EXT TRYPTASE: ABNORMAL
EXT TSH: ABNORMAL
EXT URIC ACID: ABNORMAL
EXT URINE AMYLASE U/HR: ABNORMAL
EXT URINE AMYLASE U/L: ABNORMAL
EXT VASOACTIVE INTESTINAL POLYPEPTIDE: ABNORMAL
EXT VITAMIN B12: ABNORMAL
EXT VMA 24 HR URINE: ABNORMAL
EXT WBC: 3.83 X10(9)/L (ref 4–11)
NEURON SPECIFIC ENOLASE: ABNORMAL

## 2019-07-10 ENCOUNTER — TELEPHONE (OUTPATIENT)
Dept: NEUROLOGY | Facility: HOSPITAL | Age: 64
End: 2019-07-10

## 2019-07-10 NOTE — TELEPHONE ENCOUNTER
----- Message from Marielle Abernathy sent at 7/10/2019 11:15 AM CDT -----  Contact: self, [phone]  Patient states he is scheduled to see you on 7/16 and wants to know if you need him to email you his latest report and latest blood work.

## 2019-07-10 NOTE — TELEPHONE ENCOUNTER
Patient will email reports of scans that were sent recently.  Will add to tumor board to evaluate response to treatment.

## 2019-07-15 ENCOUNTER — PATIENT MESSAGE (OUTPATIENT)
Dept: NEUROLOGY | Facility: HOSPITAL | Age: 64
End: 2019-07-15

## 2019-07-16 ENCOUNTER — TELEPHONE (OUTPATIENT)
Dept: NEUROLOGY | Facility: HOSPITAL | Age: 64
End: 2019-07-16

## 2019-07-16 ENCOUNTER — OFFICE VISIT (OUTPATIENT)
Dept: NEUROLOGY | Facility: HOSPITAL | Age: 64
End: 2019-07-16
Attending: INTERNAL MEDICINE
Payer: MEDICARE

## 2019-07-16 VITALS
HEART RATE: 70 BPM | BODY MASS INDEX: 30.74 KG/M2 | HEIGHT: 68 IN | WEIGHT: 202.81 LBS | DIASTOLIC BLOOD PRESSURE: 72 MMHG | SYSTOLIC BLOOD PRESSURE: 122 MMHG | TEMPERATURE: 98 F | OXYGEN SATURATION: 100 %

## 2019-07-16 DIAGNOSIS — C78.7 SECONDARY MALIGNANT NEOPLASM OF LIVER: ICD-10-CM

## 2019-07-16 DIAGNOSIS — C7B.8 SECONDARY NEUROENDOCRINE TUMOR OF BONE: ICD-10-CM

## 2019-07-16 DIAGNOSIS — C7A.012 MALIGNANT CARCINOID TUMOR OF THE ILEUM: Primary | ICD-10-CM

## 2019-07-16 LAB
EXT 24 HR UR METANEPHRINE: ABNORMAL
EXT 24 HR UR NORMETANEPHRINE: ABNORMAL
EXT 24 HR UR NORMETANEPHRINE: ABNORMAL
EXT 25 HYDROXY VIT D2: ABNORMAL
EXT 25 HYDROXY VIT D3: ABNORMAL
EXT 5 HIAA 24 HR URINE: ABNORMAL
EXT 5 HIAA BLOOD: 326 NG/ML (ref 0–22)
EXT ACTH: ABNORMAL
EXT AFP: ABNORMAL
EXT ALBUMIN: ABNORMAL
EXT ALKALINE PHOSPHATASE: ABNORMAL
EXT ALT: ABNORMAL
EXT AMYLASE: ABNORMAL
EXT ANTI ISLET CELL AB: ABNORMAL
EXT ANTI PARIETAL CELL AB: ABNORMAL
EXT ANTI THYROID AB: ABNORMAL
EXT AST: ABNORMAL
EXT BILIRUBIN DIRECT: ABNORMAL MG/DL
EXT BILIRUBIN TOTAL: ABNORMAL
EXT BK VIRUS DNA QN PCR: ABNORMAL
EXT BUN: ABNORMAL
EXT C PEPTIDE: ABNORMAL
EXT CA 125: ABNORMAL
EXT CA 19-9: ABNORMAL
EXT CA 27-29: ABNORMAL
EXT CALCITONIN: ABNORMAL
EXT CALCIUM: ABNORMAL
EXT CEA: ABNORMAL
EXT CHLORIDE: ABNORMAL
EXT CHOLESTEROL: ABNORMAL
EXT CHROMOGRANIN A: ABNORMAL
EXT CO2: ABNORMAL
EXT CREATININE UA: ABNORMAL
EXT CREATININE: ABNORMAL MG/DL
EXT CYCLOSPORONE LEVEL: ABNORMAL
EXT DOPAMINE: ABNORMAL
EXT EBV DNA BY PCR: ABNORMAL
EXT EPINEPHRINE: ABNORMAL
EXT FOLATE: ABNORMAL
EXT FREE T3: ABNORMAL
EXT FREE T4: ABNORMAL
EXT FSH: ABNORMAL
EXT GASTRIN RELEASING PEPTIDE: ABNORMAL
EXT GASTRIN RELEASING PEPTIDE: ABNORMAL
EXT GASTRIN: ABNORMAL
EXT GGT: ABNORMAL
EXT GHRELIN: ABNORMAL
EXT GLUCAGON: ABNORMAL
EXT GLUCOSE: ABNORMAL
EXT GROWTH HORMONE: ABNORMAL
EXT HCV RNA QUANT PCR: ABNORMAL
EXT HDL: ABNORMAL
EXT HEMATOCRIT: ABNORMAL
EXT HEMOGLOBIN A1C: ABNORMAL %
EXT HEMOGLOBIN: ABNORMAL
EXT HISTAMINE 24 HR URINE: ABNORMAL
EXT HISTAMINE: ABNORMAL
EXT IGF-1: ABNORMAL
EXT IMMUNKNOW (NON-STIMULATED): ABNORMAL
EXT IMMUNKNOW (STIMULATED): ABNORMAL
EXT INR: ABNORMAL
EXT INSULIN: ABNORMAL
EXT LANREOTIDE LEVEL: ABNORMAL
EXT LDH, TOTAL: ABNORMAL
EXT LDL CHOLESTEROL: ABNORMAL
EXT LIPASE: ABNORMAL
EXT MAGNESIUM: ABNORMAL
EXT METANEPHRINE FREE PLASMA: ABNORMAL
EXT MOTILIN: ABNORMAL
EXT NEUROKININ A CAMB: ABNORMAL
EXT NEUROKININ A ISI: <10 PG/ML (ref 0–40)
EXT NEUROTENSIN: ABNORMAL
EXT NOREPINEPHRINE: ABNORMAL
EXT NORMETANEPHRINE: ABNORMAL
EXT NSE: ABNORMAL
EXT OCTREOTIDE LEVEL: ABNORMAL
EXT PANCREASTATIN CAMB: ABNORMAL
EXT PANCREASTATIN ISI: 537 PG/ML (ref 10–135)
EXT PANCREATIC POLYPEPTIDE: ABNORMAL
EXT PHOSPHORUS: ABNORMAL
EXT PLATELETS: ABNORMAL
EXT POTASSIUM: ABNORMAL
EXT PROGRAF LEVEL: ABNORMAL
EXT PROLACTIN: ABNORMAL
EXT PROTEIN TOTAL: ABNORMAL
EXT PROTEIN UA: ABNORMAL
EXT PT: ABNORMAL
EXT PTH, INTACT: ABNORMAL
EXT PTT: ABNORMAL
EXT RAPAMUNE LEVEL: ABNORMAL
EXT SEROTONIN: 425 NG/ML (ref 56–244)
EXT SODIUM: ABNORMAL MMOL/L
EXT SOMATOSTATIN: ABNORMAL
EXT SUBSTANCE P: ABNORMAL
EXT TRIGLYCERIDES: ABNORMAL
EXT TRYPTASE: ABNORMAL
EXT TSH: ABNORMAL
EXT URIC ACID: ABNORMAL
EXT URINE AMYLASE U/HR: ABNORMAL
EXT URINE AMYLASE U/L: ABNORMAL
EXT VASOACTIVE INTESTINAL POLYPEPTIDE: ABNORMAL
EXT VITAMIN B12: ABNORMAL
EXT VMA 24 HR URINE: ABNORMAL
EXT WBC: ABNORMAL
NEURON SPECIFIC ENOLASE: ABNORMAL

## 2019-07-16 PROCEDURE — 99215 OFFICE O/P EST HI 40 MIN: CPT | Performed by: INTERNAL MEDICINE

## 2019-07-16 PROCEDURE — 99214 OFFICE O/P EST MOD 30 MIN: CPT | Mod: ,,, | Performed by: INTERNAL MEDICINE

## 2019-07-16 PROCEDURE — 99214 PR OFFICE/OUTPT VISIT, EST, LEVL IV, 30-39 MIN: ICD-10-PCS | Mod: ,,, | Performed by: INTERNAL MEDICINE

## 2019-07-16 NOTE — PROGRESS NOTES
[facility]:  [facility]  A collaboration between [facility] and [facility]    PATIENT: Tereso Simms  MRN: [id number]  DATE: 7/16/2019      Diagnosis:   1. Malignant carcinoid tumor of the ileum    2. Secondary malignant neoplasm of liver    3. Secondary neuroendocrine tumor of bone        Chief Complaint: Follow-up (follow up)      Oncologic History:      Oncologic History Small intestine neuroendocrine tumor diagnosed 10/2000  Recurrent disease to liver 9/2008  Recurrent disease to bone 2015    Oncologic Treatment Small bowel resection, 10/2000  Cytoreduction 2/2009 ([place])  Sandostatin 2/2012  Radioembolization 5/2015  Gary embolization 7/2015, 8/2015  Lanreotide 8/2015  CAPTEM 7/2016 - 9/2016  TACE 11/2016  CAPTEM 11/2016 - 2/2019   TACE 3/2107  TACE 9/2017    Pathology 10/2000 well-differentiated  2/2008 Ki-67 less than 1%          Subjective:    Interval History: Mr. Simms is a 64 y.o. male who is seen  in follow-up for a small intestine neuroendocrine tumor.  He states he has been feeling well. In 02/2019 he was taken off CAPTEM in order to give him a chemo holiday.  Repeat imaging in 06/2019 had shown overall stability of disease.  He states that he has been fully active and planning a trip to [place] for the month of August.  He is without complaints.    He has previously seen Jimenez Nava and Malaika.  His history dates to 10/2000 when he was diagnosed with a terminal ileal  Carcinoid tumor.  He underwent a small bowel resection in 10/2000 with pathology revealing multiple carcinoid tumors with some largest measuring 4 cm. 21 out of 38 lymph nodes were positive for tumor.  There was no evidence of distant disease at that time. He did well with surveillance until 2008 when an octreotide scan had shown uptake in the right lobe of the liver. He underwent cytoreduction by Dr. Urban with pathology  showing a neuroendocrine tumor with Ki-67 less than 1%. He was initiated on Sandostatin in 2/2012.  In 2015 he underwent radioembolization as well as bland embolization. He also was initiated on Lanreotide.  He was initiated on CAPTEM in 7/2016.  He underwent additional TACE in 11/2016, 3/2017 and 9/2017.  CAPTEM was discontinued in 02/2018 in order to give him a chemo holiday.      Past Medical History:   Past Medical History:   Diagnosis Date    Diabetes     Diabetes mellitus, type 2     Hypertension     Malignant carcinoid tumor of the ileum 10/05/2000    Secondary neuroendocrine tumor of bone(209.73) 11/15/2016    Secondary neuroendocrine tumor of liver     Secondary neuroendocrine tumor of other sites     pancreas       Past Surgical HIstory:   Past Surgical History:   Procedure Laterality Date    APPENDECTOMY      CEREBRAL ANEURYSM REPAIR      clipped-NO MRI    EMBOLIZATION N/A 3/10/2017    [facility] OR    EMBOLIZATION N/A 11/11/2016    [facility]    right hepatic partial lobectomy, t non anatomic tumors removed, RFA right lobe, cholecystectomy, excision of pancreatic tumor, lysis of adhesions,  2/25/2009- [facility] [place]    small bowel resection, lymph node resection, 21/38 positive, right colectomy, lymphovascular, perineural invasion  10/2000    TACE  11/2016    Y-90 microspheres  July 2012-[facility]       Family History:   Family History   Problem Relation Age of Onset    Heart failure Father     Diabetes Father     COPD Mother     Cancer Mother         RCC    Cancer Sister         breast       Social History:  reports that he has never smoked. He has never used smokeless tobacco. He reports that he drinks alcohol. He reports that he does not use drugs.    Allergies:  Review of patient's allergies indicates:   Allergen Reactions    Epinephrine Other (See Comments)     Precipitates a carcinoid crisis        Medications:  Current Outpatient Medications   Medication Sig Dispense Refill    ascorbic acid, vitamin C, (VITAMIN C) 1000 MG tablet Take 1,000 mg by mouth once daily.      calcium carbonate (TUMS) 200 mg calcium (500 mg) chewable tablet Take 1 tablet by mouth once daily.      cholecalciferol, vitamin D3, 2,000 unit Tab 1 TABLET DAILY      lanreotide (SOMATULINE DEPOT) 120 mg/0.5 mL Syrg Inject 120 mg into the skin every 30 days.      lisinopril (PRINIVIL,ZESTRIL) 5 MG tablet Take 5 mg by mouth once daily.      loperamide (IMODIUM) 1 mg/5 mL solution Take by mouth 2 (two) times daily.      sitagliptan-metformin (JANUMET) 50-1,000 mg per tablet Take 1 tablet by mouth 2 (two) times daily with meals.      ZOLEDRONIC ACID (ZOMETA IV) Inject into the vein every 3 (three) months.       capecitabine (XELODA) 500 MG Tab Take 500 mg by mouth 2 (two) times daily. Pt ttake 1650mg in am/ 1500mg at dinner -- 14 days on, 14 days off      ondansetron (ZOFRAN) 4 MG tablet Take 1 tablet (4 mg total) by mouth every 6 (six) hours as needed for Nausea. 40 tablet 0    ondansetron (ZOFRAN-ODT) 4 MG TbDL Take 1 tablet (4 mg total) by mouth every 6 (six) hours as needed. 25 tablet 0    temozolomide (TEMODAR) 100 MG capsule Take 300 mg/m2/day by mouth every evening. Pt take 300mg 5 days out of a month      vitamin D 1000 units Tab Take 185 mg by mouth once daily.       No current facility-administered medications for this visit.        Review of Systems   Constitutional: Negative for appetite change, chills, fatigue, fever and unexpected weight change.   HENT: Negative for dental problem, sinus pressure and sneezing.    Eyes: Negative for visual disturbance.   Respiratory: Negative for cough, choking, chest tightness and shortness of breath.    Cardiovascular: Negative for chest pain and leg swelling.   Gastrointestinal: Negative for abdominal pain, blood in stool, constipation, diarrhea and nausea.   Genitourinary:  "Negative for difficulty urinating and dysuria.   Musculoskeletal: Negative for arthralgias and back pain.   Skin: Negative for rash and wound.   Neurological: Negative for dizziness, light-headedness and headaches.   Hematological: Negative for adenopathy. Does not bruise/bleed easily.   Psychiatric/Behavioral: Negative for sleep disturbance. The patient is not nervous/anxious.        ECOG Performance Status: 0   Objective:      Vitals:   Vitals:    07/16/19 1501   BP: 122/72   BP Location: Right arm   Patient Position: Sitting   BP Method: Medium (Automatic)   Pulse: 70   Temp: 98.1 °F (36.7 °C)   TempSrc: Oral   SpO2: 100%   Weight: 92 kg (202 lb 13.2 oz)   Height: 5' 8" (1.727 m)     BMI: Body mass index is 30.84 kg/m².    Physical Exam   Constitutional: He is oriented to person, place, and time. He appears well-developed and well-nourished.   HENT:   Head: Normocephalic and atraumatic.   Eyes: Pupils are equal, round, and reactive to light.   Neck: Normal range of motion. Neck supple.   Cardiovascular: Normal rate and regular rhythm.   Pulmonary/Chest: Effort normal and breath sounds normal. No respiratory distress.   Abdominal: Soft. He exhibits no distension. There is no tenderness.   Musculoskeletal: He exhibits no edema or tenderness.   Lymphadenopathy:     He has no cervical adenopathy.   Neurological: He is alert and oriented to person, place, and time. No cranial nerve deficit.   Skin: Skin is warm and dry.   Psychiatric: He has a normal mood and affect. His behavior is normal.       Laboratory Data:  Abstract on 07/16/2019   No results displayed because visit has over 200 results.          Imaging:   Outside images reviewed.               Assessment:       1. Malignant carcinoid tumor of the ileum    2. Secondary malignant neoplasm of liver    3. Secondary neuroendocrine tumor of bone           Plan:     Mr. Simms continues to do well from an oncologic standpoint.  He remains on Lanreotide and scans " from 06/2019 including a CT showed overall stability of disease.  It is noted that his bilirubin was mildly increased in February and since being off chemotherapy has continue to decline, however, his last bilirubin was still 1.6.  I do get concerned that he may have developed a biliary stricture.  I will send him for repeat bilirubin today.  If this remains elevated he may need to see GI for potential ERCP.  He is planning on spending the month of August and [place] and have told him I would like to repeat a gallium 68 PET-CT on him in September when he gets back to compare to his PET in February.  We have also sent off tumor markers today.  Multiple questions were answered and he is agreeable with this plan.    Tereso Yuen DO, FACP  Hematology & Oncology, [facility]  [address] [place] [address], [address]  [place]  [address]  ph. [phone]; [phone]  fax. [phone]    25 minutes were spent in coordination of patient's care, record review and counseling.  More than 50% of the time was face-to-face.

## 2019-07-16 NOTE — TELEPHONE ENCOUNTER
----- Message from Amparo Parada sent at 7/16/2019  9:59 AM CDT -----  Contact: [phone]/self  Patient states he just arrived and is requesting to be seen sooner than 3pm today. Please call and advise.

## 2019-07-16 NOTE — LETTER
July 16, 2019        MD Prema Khan & Negrito Steele  [facility] [id number] [facility] [place] [address]  Phone: [phone]  Fax: [phone]   Patient: Tereso Simms   MR Number: [id number]   YOB: 1955   Date of Visit: 7/16/2019       Dear Dr. Ramires:    Thank you for referring Tereso Simms to me for evaluation. Attached you will find relevant portions of my assessment and plan of care.    If you have questions, please do not hesitate to call me. I look forward to following Tereso Simms along with you.    Sincerely,      Tereso Yuen, DO, FACP            CC  No Recipients    Enclosure

## 2019-07-18 ENCOUNTER — TELEPHONE (OUTPATIENT)
Dept: NEUROLOGY | Facility: HOSPITAL | Age: 64
End: 2019-07-18

## 2019-07-18 NOTE — TELEPHONE ENCOUNTER
----- Message from Tereso Yuen DO, FACP sent at 7/17/2019  4:27 PM CDT -----  Please let patient know that bilirubin continues to improve.  Will hold of on GI evaluation.  His creatinine is mildly elevated and he should increase his fluid intake.  Follow back up with Dr. Ramires and repeat scans as discussed.

## 2019-07-18 NOTE — TELEPHONE ENCOUNTER
Gave patient message:  Please let patient know that bilirubin continues to improve.  Will hold of on GI evaluation.  His creatinine is mildly elevated and he should increase his fluid intake.  Follow back up with Dr. Ramires and repeat scans as discussed.     Patient agrees.

## 2019-09-23 LAB
EXT 24 HR UR METANEPHRINE: ABNORMAL
EXT 24 HR UR NORMETANEPHRINE: ABNORMAL
EXT 24 HR UR NORMETANEPHRINE: ABNORMAL
EXT 25 HYDROXY VIT D2: ABNORMAL
EXT 25 HYDROXY VIT D3: ABNORMAL
EXT 5 HIAA 24 HR URINE: ABNORMAL
EXT 5 HIAA BLOOD: ABNORMAL
EXT ACTH: ABNORMAL
EXT AFP: ABNORMAL
EXT ALBUMIN: 3.8 G/DL (ref 3.5–5)
EXT ALKALINE PHOSPHATASE: 293 IU/L (ref 38–126)
EXT ALT: ABNORMAL
EXT AMYLASE: ABNORMAL
EXT ANTI ISLET CELL AB: ABNORMAL
EXT ANTI PARIETAL CELL AB: ABNORMAL
EXT ANTI THYROID AB: ABNORMAL
EXT AST: ABNORMAL
EXT BILIRUBIN DIRECT: ABNORMAL
EXT BILIRUBIN TOTAL: 1.9 MG/DL (ref 0.2–1.3)
EXT BK VIRUS DNA QN PCR: ABNORMAL
EXT BUN: ABNORMAL
EXT C PEPTIDE: ABNORMAL
EXT CA 125: ABNORMAL
EXT CA 19-9: ABNORMAL
EXT CA 27-29: ABNORMAL
EXT CALCITONIN: ABNORMAL
EXT CALCIUM: 8.7 MG/DL (ref 8.4–10.2)
EXT CEA: ABNORMAL
EXT CHLORIDE: 105 MEQ/L (ref 100–110)
EXT CHOLESTEROL: ABNORMAL
EXT CHROMOGRANIN A: ABNORMAL
EXT CO2: 24 MEQ/L (ref 22–30)
EXT CREATININE UA: ABNORMAL
EXT CREATININE: ABNORMAL
EXT CYCLOSPORONE LEVEL: ABNORMAL
EXT DOPAMINE: ABNORMAL
EXT EBV DNA BY PCR: ABNORMAL
EXT EPINEPHRINE: ABNORMAL
EXT FOLATE: ABNORMAL
EXT FREE T3: ABNORMAL
EXT FREE T4: ABNORMAL
EXT FSH: ABNORMAL
EXT GASTRIN RELEASING PEPTIDE: ABNORMAL
EXT GASTRIN RELEASING PEPTIDE: ABNORMAL
EXT GASTRIN: ABNORMAL
EXT GGT: ABNORMAL
EXT GHRELIN: ABNORMAL
EXT GLUCAGON: ABNORMAL
EXT GLUCOSE: ABNORMAL
EXT GROWTH HORMONE: ABNORMAL
EXT HCV RNA QUANT PCR: ABNORMAL
EXT HDL: ABNORMAL
EXT HEMATOCRIT: 32 % (ref 38–52)
EXT HEMOGLOBIN A1C: ABNORMAL
EXT HEMOGLOBIN: 9.8 G/DL (ref 13.5–17.5)
EXT HISTAMINE 24 HR URINE: ABNORMAL
EXT HISTAMINE: ABNORMAL
EXT IGF-1: ABNORMAL
EXT IMMUNKNOW (NON-STIMULATED): ABNORMAL
EXT IMMUNKNOW (STIMULATED): ABNORMAL
EXT INR: ABNORMAL
EXT INSULIN: ABNORMAL
EXT LANREOTIDE LEVEL: ABNORMAL
EXT LDH, TOTAL: ABNORMAL
EXT LDL CHOLESTEROL: ABNORMAL
EXT LIPASE: ABNORMAL
EXT MAGNESIUM: ABNORMAL
EXT METANEPHRINE FREE PLASMA: ABNORMAL
EXT MOTILIN: ABNORMAL
EXT NEUROKININ A CAMB: ABNORMAL
EXT NEUROKININ A ISI: ABNORMAL
EXT NEUROTENSIN: ABNORMAL
EXT NOREPINEPHRINE: ABNORMAL
EXT NORMETANEPHRINE: ABNORMAL
EXT NSE: ABNORMAL
EXT OCTREOTIDE LEVEL: ABNORMAL
EXT PANCREASTATIN CAMB: ABNORMAL
EXT PANCREASTATIN ISI: ABNORMAL
EXT PANCREATIC POLYPEPTIDE: ABNORMAL
EXT PHOSPHORUS: ABNORMAL
EXT PLATELETS: 96 X10(9)/L (ref 150–450)
EXT POTASSIUM: 4.4 MEQ/L (ref 3.4–5)
EXT PROGRAF LEVEL: ABNORMAL
EXT PROLACTIN: ABNORMAL
EXT PROTEIN TOTAL: 8.8 G/DL (ref 6.3–8.2)
EXT PROTEIN UA: ABNORMAL
EXT PT: ABNORMAL
EXT PTH, INTACT: ABNORMAL
EXT PTT: ABNORMAL
EXT RAPAMUNE LEVEL: ABNORMAL
EXT SEROTONIN: ABNORMAL
EXT SODIUM: 138 MEQ/L (ref 137–145)
EXT SOMATOSTATIN: ABNORMAL
EXT SUBSTANCE P: ABNORMAL
EXT TRIGLYCERIDES: ABNORMAL
EXT TRYPTASE: ABNORMAL
EXT TSH: ABNORMAL
EXT URIC ACID: ABNORMAL
EXT URINE AMYLASE U/HR: ABNORMAL
EXT URINE AMYLASE U/L: ABNORMAL
EXT VASOACTIVE INTESTINAL POLYPEPTIDE: ABNORMAL
EXT VITAMIN B12: ABNORMAL
EXT VMA 24 HR URINE: ABNORMAL
EXT WBC: 4.63 X10(9)/L (ref 4–11)
NEURON SPECIFIC ENOLASE: ABNORMAL

## 2019-09-24 ENCOUNTER — TELEPHONE (OUTPATIENT)
Dept: NEUROLOGY | Facility: HOSPITAL | Age: 64
End: 2019-09-24

## 2019-09-24 NOTE — TELEPHONE ENCOUNTER
----- Message from Peyton Muir sent at 9/24/2019 10:21 AM CDT -----  Contact: Patient/ [phone]  RR---Patient is calling to inform the office that he mailed the CD, Gallium and Lab results to the office and that the office should receive the results on 9/25/2019.  Pt stated that he would like to know what do the doctor recommend.    Please call and advise.

## 2019-10-09 ENCOUNTER — TELEPHONE (OUTPATIENT)
Dept: NEUROLOGY | Facility: HOSPITAL | Age: 64
End: 2019-10-09

## 2019-10-09 NOTE — TELEPHONE ENCOUNTER
----- Message from Afua Contreras sent at 10/9/2019 11:27 AM CDT -----  Contact: [phone]-self  Patient states he is waiting on a call from the tumor board. States he was told this last week. Please advise.

## 2019-10-10 ENCOUNTER — TELEPHONE (OUTPATIENT)
Dept: NEUROLOGY | Facility: HOSPITAL | Age: 64
End: 2019-10-10

## 2019-10-10 NOTE — TELEPHONE ENCOUNTER
Called patient to schedule PRRT as proposed in 10/1/19 Tumor Board. Pt. Has appointment with Dr. Ramires on 10/22/19 for next Lanreotide inj and will discussed when to start PRRT. Pt. Planning to receive PRRT treatment in [place]. He will call with start dates of PRRT. Side note, patient wanting to push start date to January, due to 2 cruises plan with his family. Advised patient to refer to Dr. Yuen or Dr. Ramires as to risk involved in post phoning treatment.

## 2019-12-31 ENCOUNTER — TELEPHONE (OUTPATIENT)
Dept: NEUROLOGY | Facility: HOSPITAL | Age: 64
End: 2019-12-31

## 2019-12-31 NOTE — TELEPHONE ENCOUNTER
Patient said he starts PRRT locally on Alvin 15. He is scheduled the following day for his Lanreotide.  He asked if Dr. Yuen did the same thing. I explained this indeed was our standard of care.

## 2019-12-31 NOTE — TELEPHONE ENCOUNTER
----- Message from Peyton Muir sent at 12/31/2019  9:41 AM CST -----  Contact: Pt/ [phone]  RR----Pt is requesting a callback in regards to having questions that the pt would like to ask the Nurse.    Please call

## 2020-03-04 ENCOUNTER — PATIENT MESSAGE (OUTPATIENT)
Dept: NEUROLOGY | Facility: HOSPITAL | Age: 65
End: 2020-03-04

## 2020-03-05 ENCOUNTER — TELEPHONE (OUTPATIENT)
Dept: NEUROLOGY | Facility: HOSPITAL | Age: 65
End: 2020-03-05

## 2020-03-05 NOTE — TELEPHONE ENCOUNTER
----- Message from Peyton Muir sent at 3/5/2020  9:17 AM CST -----  Contact: Pt/ [phone]  RR----Tereso Simms calling regarding Platelets being very low.  Pt would like to know what the doctor recommend in regards to  PRRT treatment.    Please call and advise.

## 2020-04-03 ENCOUNTER — PATIENT MESSAGE (OUTPATIENT)
Dept: NEUROLOGY | Facility: HOSPITAL | Age: 65
End: 2020-04-03

## 2020-04-13 ENCOUNTER — PATIENT MESSAGE (OUTPATIENT)
Dept: NEUROLOGY | Facility: HOSPITAL | Age: 65
End: 2020-04-13

## 2020-05-11 ENCOUNTER — PATIENT MESSAGE (OUTPATIENT)
Dept: NEUROLOGY | Facility: HOSPITAL | Age: 65
End: 2020-05-11

## 2020-05-13 ENCOUNTER — OFFICE VISIT (OUTPATIENT)
Dept: NEUROLOGY | Facility: HOSPITAL | Age: 65
End: 2020-05-13
Attending: INTERNAL MEDICINE
Payer: MEDICARE

## 2020-05-13 DIAGNOSIS — C7B.8 SECONDARY NEUROENDOCRINE TUMOR OF LIVER: ICD-10-CM

## 2020-05-13 DIAGNOSIS — D69.6 THROMBOCYTOPENIA: ICD-10-CM

## 2020-05-13 DIAGNOSIS — C7A.012 MALIGNANT CARCINOID TUMOR OF THE ILEUM: Primary | ICD-10-CM

## 2020-05-13 DIAGNOSIS — C7B.8 SECONDARY NEUROENDOCRINE TUMOR OF BONE: ICD-10-CM

## 2020-05-13 PROCEDURE — 99215 OFFICE O/P EST HI 40 MIN: CPT | Mod: 95,ICN,, | Performed by: INTERNAL MEDICINE

## 2020-05-13 PROCEDURE — 99215 PR OFFICE/OUTPT VISIT, EST, LEVL V, 40-54 MIN: ICD-10-PCS | Mod: 95,ICN,, | Performed by: INTERNAL MEDICINE

## 2020-05-13 NOTE — PROGRESS NOTES
NOLANETS:  [facility]  A collaboration between [facility] and [facility]    PATIENT: Tereso Simms  MRN: [id number]  DATE: 5/13/2020      Diagnosis:   1. Malignant carcinoid tumor of the ileum    2. Secondary neuroendocrine tumor of liver    3. Secondary neuroendocrine tumor of bone    4. Thrombocytopenia        Chief Complaint: Follow-up      Oncologic History:      Oncologic History Small intestine neuroendocrine tumor diagnosed 10/2000  Recurrent disease to liver 9/2008  Recurrent disease to bone 2015    Oncologic Treatment Small bowel resection, 10/2000  Cytoreduction 2/2009 ([place])  Sandostatin 2/2012  Radioembolization 5/2015  [place] embolization 7/2015, 8/2015  Lanreotide 8/2015  CAPTEM 7/2016 - 9/2016  TACE 11/2016  CAPTEM 11/2016 - 2/2019   TACE 3/2107  TACE 9/2017  Kerry-177 1/2020    Pathology 10/2000 well-differentiated  2/2008 Ki-67 less than 1%      The patient location is: home  The chief complaint leading to consultation is: follow up  Visit type: audiovisual  Total time spent with patient: 45 min  Each patient to whom he or she provides medical services by telemedicine is:  (1) informed of the relationship between the physician and patient and the respective role of any other health care provider with respect to management of the patient; and (2) notified that he or she may decline to receive medical services by telemedicine and may withdraw from such care at any time.    Notes:       Subjective:    Interval History: Mr. Simms is a 65 y.o. male who is seen  in follow-up for a small intestine neuroendocrine tumor.  Since I had seen him last he was initiated on PRRT in 01/2020.  While he initially tolerated this treatment well it was noted that he had developed persistent thrombocytopenia.  He is seen today with his wife.  He states that he continues to feel well.  He denies any abdominal pain, nausea,  vomiting, diarrhea.  He states his weight has been stable.  He did have an episode pain related to kidney stones but this has resolved.  He is being referred to Hematology later this week.  He has no other new complaints.    He has previously seen Jimenez Nava and Malaika.  His history dates to 10/2000 when he was diagnosed with a terminal ileal  Carcinoid tumor.  He underwent a small bowel resection in 10/2000 with pathology revealing multiple carcinoid tumors with some largest measuring 4 cm. 21 out of 38 lymph nodes were positive for tumor.  There was no evidence of distant disease at that time. He did well with surveillance until 2008 when an octreotide scan had shown uptake in the right lobe of the liver. He underwent cytoreduction by Dr. Urban with pathology showing a neuroendocrine tumor with Ki-67 less than 1%. He was initiated on Sandostatin in 2/2012.  In 2015 he underwent radioembolization as well as bland embolization. He also was initiated on Lanreotide.  He was initiated on CAPTEM in 7/2016.  He underwent additional TACE in 11/2016, 3/2017 and 9/2017.  CAPTEM was discontinued in 02/2019 in order to give him a chemo holiday.  He was started on PRRT with Kerry-177 in 01/2020      Past Medical History:   Past Medical History:   Diagnosis Date    Diabetes     Diabetes mellitus, type 2     Hypertension     Malignant carcinoid tumor of the ileum 10/05/2000    Secondary neuroendocrine tumor of bone(209.73) 11/15/2016    Secondary neuroendocrine tumor of liver     Secondary neuroendocrine tumor of other sites     pancreas    Thrombocytopenia 5/13/2020       Past Surgical HIstory:   Past Surgical History:   Procedure Laterality Date    APPENDECTOMY      CEREBRAL ANEURYSM REPAIR      clipped-NO MRI    right hepatic partial lobectomy, t non anatomic tumors removed, RFA right lobe, cholecystectomy, excision of pancreatic tumor, lysis of adhesions,  2/25/2009- [facility]    small bowel resection,  lymph node resection, 21/38 positive, right colectomy, lymphovascular, perineural invasion  10/2000    TACE  11/2016    Y-90 microspheres  July 2012-[place]       Family History:   Family History   Problem Relation Age of Onset    Heart failure Father     Diabetes Father     COPD Mother     Cancer Mother         RCC    Cancer Sister         breast       Social History:  reports that he has never smoked. He has never used smokeless tobacco. He reports that he drinks alcohol. He reports that he does not use drugs.    Allergies:  Review of patient's allergies indicates:   Allergen Reactions    Epinephrine Other (See Comments)     Precipitates a carcinoid crisis       Medications:  Current Outpatient Medications   Medication Sig Dispense Refill    ascorbic acid, vitamin C, (VITAMIN C) 1000 MG tablet Take 1,000 mg by mouth once daily.      calcium carbonate (TUMS) 200 mg calcium (500 mg) chewable tablet Take 1 tablet by mouth once daily.      capecitabine (XELODA) 500 MG Tab Take 500 mg by mouth 2 (two) times daily. Pt ttake 1650mg in am/ 1500mg at dinner -- 14 days on, 14 days off      cholecalciferol, vitamin D3, 2,000 unit Tab 1 TABLET DAILY      lanreotide (SOMATULINE DEPOT) 120 mg/0.5 mL Syrg Inject 120 mg into the skin every 30 days.      lisinopril (PRINIVIL,ZESTRIL) 5 MG tablet Take 5 mg by mouth once daily.      loperamide (IMODIUM) 1 mg/5 mL solution Take by mouth 2 (two) times daily.      ondansetron (ZOFRAN) 4 MG tablet Take 1 tablet (4 mg total) by mouth every 6 (six) hours as needed for Nausea. 40 tablet 0    ondansetron (ZOFRAN-ODT) 4 MG TbDL Take 1 tablet (4 mg total) by mouth every 6 (six) hours as needed. 25 tablet 0    sitagliptan-metformin (JANUMET) 50-1,000 mg per tablet Take 1 tablet by mouth 2 (two) times daily with meals.      temozolomide (TEMODAR) 100 MG capsule Take 300 mg/m2/day by mouth every evening. Pt take 300mg 5 days out of a month      vitamin D 1000 units Tab Take  185 mg by mouth once daily.      ZOLEDRONIC ACID (ZOMETA IV) Inject into the vein every 3 (three) months.        No current facility-administered medications for this visit.        Review of Systems   Constitutional: Negative for appetite change, chills, fatigue, fever and unexpected weight change.   HENT: Negative for dental problem, sinus pressure and sneezing.    Eyes: Negative for visual disturbance.   Respiratory: Negative for cough, choking, chest tightness and shortness of breath.    Cardiovascular: Negative for chest pain and leg swelling.   Gastrointestinal: Negative for abdominal pain, blood in stool, constipation, diarrhea and nausea.   Genitourinary: Negative for difficulty urinating and dysuria.   Musculoskeletal: Negative for arthralgias and back pain.   Skin: Negative for rash and wound.   Neurological: Negative for dizziness, light-headedness and headaches.   Hematological: Negative for adenopathy. Does not bruise/bleed easily.   Psychiatric/Behavioral: Negative for sleep disturbance. The patient is not nervous/anxious.        ECOG Performance Status: 0   Objective:      Vitals:   There were no vitals filed for this visit.  BMI: There is no height or weight on file to calculate BMI.    Physical Exam   Constitutional: He is oriented to person, place, and time. He appears well-developed and well-nourished.   Neurological: He is alert and oriented to person, place, and time.       Laboratory Data:  No visits with results within 1 Month(s) from this visit.   Latest known visit with results is:   Abstract on 09/27/2019   Component Date Value Ref Range Status    EXT WBC 09/23/2019 4.63  4.00 - 11.00 x10(9)/L Final    EXT Hemoglobin 09/23/2019 9.8* 13.5 - 17.5 g/dL Final    EXT Hematocrit 09/23/2019 32.0* 38.0 - 52.0 % Final    EXT Platelets 09/23/2019 96* 150 - 450 x10(9)/L Final    EXT Sodium 09/23/2019 138  137 - 145 mEq/L Final    EXT Potassium 09/23/2019 4.4  3.4 - 5.0 mEq/L Final    EXT  Chloride 09/23/2019 105  100 - 110 mEq/L Final    EXT CO2 09/23/2019 24.0  22.0 - 30.0 mEq/L Final    EXT Calcium 09/23/2019 8.7  8.4 - 10.2 mg/dL Final    EXT Protein total 09/23/2019 8.8* 6.3 - 8.2 g/dL Final    EXT Albumin 09/23/2019 3.8  3.5 - 5.0 g/dL Final    EXT BilirubiN Total 09/23/2019 1.9* 0.2 - 1.3 mg/dL Final    EXT Alkaline Phosphatase 09/23/2019 293* 38 - 126 IU/L Final       Imaging:   Outside image reviewed.               Assessment:       1. Malignant carcinoid tumor of the ileum    2. Secondary neuroendocrine tumor of liver    3. Secondary neuroendocrine tumor of bone    4. Thrombocytopenia           Plan:     I had a long discussion with Mr. Simms and his wife today concerning treatment options and thrombocytopenia.  He is most concerned about the long delay since his initial treatment with Kerry-177.  His platelets have been persistently low and I have reiterated that we cannot safely deliver this medication with that level of platelets and I would continue to hold treatment until his platelet count reaches 70k and then would feel we could re administer at 100 mCi.  He does have follow-up with Hematology later on this week and have explained to him that they will rule out other causes of thrombocytopenia and may offer a bone marrow biopsy if felt indicated.  I have outlined how this procedure is done and the results we would obtain.  He also wanted to know about treatment options should PRRT not be available for him.  I have told him it is extremely difficult to administer systemic therapy in the setting thrombocytopenia, however, I do think at that point we would consider further liver directed therapy if a new gallium 68 PET-CT does show progressive disease within the liver.  Additionally, everolimus may be of benefit at a dose reduction initially with close monitoring.  He is going to follow back up with Dr. Ramires and will plan to keep us updated.  Multiple questions were answered and he  is agreeable with this plan.    Tereso Yuen DO, FACP  Hematology & Oncology, [facility]  [address]  [place]  [address]  ph. [phone]; [phone]  fax. [phone]    60 minutes were spent in coordination of patient's care, record review and counseling.  More than 50% of the time was face-to-face.

## 2020-05-13 NOTE — LETTER
May 13, 2020        MD Prema Khan & Negrito Steele  [facility] [id number] [facility]  [address]  [place] [address]  Phone: [phone]  Fax: [phone]   Patient: Tereso Simms   MR Number: [id number]   YOB: 1955   Date of Visit: 5/13/2020       Dear Dr. Ramires:    Thank you for referring Tereso Simms to me for evaluation. Attached you will find relevant portions of my assessment and plan of care.    If you have questions, please do not hesitate to call me. I look forward to following Tereso Simms along with you.    Sincerely,      Tereso Yuen DO, FACP            CC  No Recipients    Enclosure

## 2020-05-29 ENCOUNTER — PATIENT MESSAGE (OUTPATIENT)
Dept: NEUROLOGY | Facility: HOSPITAL | Age: 65
End: 2020-05-29

## 2020-05-29 ENCOUNTER — TELEPHONE (OUTPATIENT)
Dept: NEUROLOGY | Facility: HOSPITAL | Age: 65
End: 2020-05-29

## 2020-05-29 NOTE — TELEPHONE ENCOUNTER
----- Message from Jo De Leon sent at 5/29/2020 11:51 AM CDT -----  Contact: Patient  RR- Patient called in stating he got his blood work results and he sent an email with that information directly to Dr. Yuen. The patient just would like [facility] to call him back and discuss paperwork and maybe schedule an appointment soon. His best contact is [phone]. Thank you!

## 2020-06-02 ENCOUNTER — CONFERENCE (OUTPATIENT)
Dept: NEUROLOGY | Facility: HOSPITAL | Age: 65
End: 2020-06-02

## 2020-06-02 NOTE — TELEPHONE ENCOUNTER
[facility]      NEUROENDOCRINE TUMOR MULTIDISCIPLINARY TUMOR BOARD  _____________________________________________________________________    PRESENTER:   JOSE Yuen, DO     REASON FOR PRESENTATION:  Review scans; ? progression.    ATTENDEES:   Surgery:              MD WILLIAM Conde MD T. Ramcharan, MD M. Maluccio, MD  Interventional Radiology - Ceferino Enriquez MD  Nuclear Medicine - Zac Parada MD  Pathology - Cal Lizama MD  Oncology - Tereso Yuen, DO  Gastroenterology - Not present   Palliative Care- CHELSIE Nails MD  Research- Magnolia Castro, phD  Nutritional Support- Tracie Weil-Cavalier, RDN  Nursing    PATIENT STATUS:      PATIENT SUMMARY:  Past Medical History:   Diagnosis Date    Diabetes     Diabetes mellitus, type 2     Hypertension     Malignant carcinoid tumor of the ileum 10/05/2000    Secondary neuroendocrine tumor of bone(209.73) 11/15/2016    Secondary neuroendocrine tumor of liver     Secondary neuroendocrine tumor of other sites     pancreas    Thrombocytopenia 5/13/2020       Past Surgical History:   Procedure Laterality Date    APPENDECTOMY      CEREBRAL ANEURYSM REPAIR      clipped-NO MRI    right hepatic partial lobectomy, t non anatomic tumors removed, RFA right lobe, cholecystectomy, excision of pancreatic tumor, lysis of adhesions,  2/25/2009- [facility]    small bowel resection, lymph node resection, 21/38 positive, right colectomy, lymphovascular, perineural invasion  10/2000    TACE  11/2016    Y-90 microspheres  July 2012-[place]     ________________________________________________________________    DISCUSSION:    CT 5/2020 is single phase and therefore limited and there are no reports. Osseous, hepatic, dante and pulm met disease which was receptor positive on a Ga-68 PET/CT 9/2019. Osseous mets appear progressed since 6/2019. There is a met in the left femoral neck which is at risk for fracture. Hepatic  mets are not well seen on single phase CT. There are more pronounced omental venous collaterals which are of unclear significance. The MPV, splenic vein and SMV appear patent. Has a 0.5 cm obstructing stone at the right UVJ with moderate right hydro which is new.    BOARD RECOMMENDATIONS:     Radiation recommended to left femoral neck  Treat hydronephrosis

## 2020-08-21 LAB
EXT 24 HR UR METANEPHRINE: ABNORMAL
EXT 24 HR UR NORMETANEPHRINE: ABNORMAL
EXT 24 HR UR NORMETANEPHRINE: ABNORMAL
EXT 25 HYDROXY VIT D2: ABNORMAL
EXT 25 HYDROXY VIT D3: ABNORMAL
EXT 5 HIAA 24 HR URINE: ABNORMAL
EXT 5 HIAA BLOOD: ABNORMAL
EXT ACTH: ABNORMAL
EXT AFP: ABNORMAL
EXT ALBUMIN: 3.5 G/DL (ref 3.5–5)
EXT ALKALINE PHOSPHATASE: 785 MG/DL (ref 38–126)
EXT ALT: ABNORMAL
EXT AMYLASE: ABNORMAL
EXT ANTI ISLET CELL AB: ABNORMAL
EXT ANTI PARIETAL CELL AB: ABNORMAL
EXT ANTI THYROID AB: ABNORMAL
EXT AST: ABNORMAL
EXT BILIRUBIN DIRECT: ABNORMAL
EXT BILIRUBIN TOTAL: 3.5 MG/DL (ref 0.2–1.3)
EXT BK VIRUS DNA QN PCR: ABNORMAL
EXT BUN: 20 MG/DL (ref 9–20)
EXT C PEPTIDE: ABNORMAL
EXT CA 125: ABNORMAL
EXT CA 19-9: ABNORMAL
EXT CA 27-29: ABNORMAL
EXT CALCITONIN: ABNORMAL
EXT CALCIUM: 8.5 MG/DL (ref 8.4–10.2)
EXT CEA: ABNORMAL
EXT CHLORIDE: 106 MEQ/L (ref 100–110)
EXT CHOLESTEROL: ABNORMAL
EXT CHROMOGRANIN A: ABNORMAL
EXT CO2: 25 MEQ/L (ref 22–30)
EXT CREATININE UA: ABNORMAL
EXT CREATININE: 0.9 MG/DL (ref 0.66–1.25)
EXT CYCLOSPORONE LEVEL: ABNORMAL
EXT DOPAMINE: ABNORMAL
EXT EBV DNA BY PCR: ABNORMAL
EXT EPINEPHRINE: ABNORMAL
EXT FOLATE: ABNORMAL
EXT FREE T3: ABNORMAL
EXT FREE T4: ABNORMAL
EXT FSH: ABNORMAL
EXT GASTRIN RELEASING PEPTIDE: ABNORMAL
EXT GASTRIN RELEASING PEPTIDE: ABNORMAL
EXT GASTRIN: ABNORMAL
EXT GGT: ABNORMAL
EXT GHRELIN: ABNORMAL
EXT GLUCAGON: ABNORMAL
EXT GLUCOSE: 149 MG/DL (ref 75–125)
EXT GROWTH HORMONE: ABNORMAL
EXT HCV RNA QUANT PCR: ABNORMAL
EXT HDL: ABNORMAL
EXT HEMATOCRIT: 34.7 % (ref 38–52)
EXT HEMOGLOBIN A1C: ABNORMAL
EXT HEMOGLOBIN: 11.1 G/DL (ref 13.5–17.5)
EXT HISTAMINE 24 HR URINE: ABNORMAL
EXT HISTAMINE: ABNORMAL
EXT IGF-1: ABNORMAL
EXT IMMUNKNOW (NON-STIMULATED): ABNORMAL
EXT IMMUNKNOW (STIMULATED): ABNORMAL
EXT INR: ABNORMAL
EXT INSULIN: ABNORMAL
EXT LANREOTIDE LEVEL: ABNORMAL
EXT LDH, TOTAL: ABNORMAL
EXT LDL CHOLESTEROL: ABNORMAL
EXT LIPASE: ABNORMAL
EXT MAGNESIUM: ABNORMAL
EXT METANEPHRINE FREE PLASMA: ABNORMAL
EXT MOTILIN: ABNORMAL
EXT NEUROKININ A CAMB: ABNORMAL
EXT NEUROKININ A ISI: ABNORMAL
EXT NEUROTENSIN: ABNORMAL
EXT NOREPINEPHRINE: ABNORMAL
EXT NORMETANEPHRINE: ABNORMAL
EXT NSE: ABNORMAL
EXT OCTREOTIDE LEVEL: ABNORMAL
EXT PANCREASTATIN CAMB: ABNORMAL
EXT PANCREASTATIN ISI: ABNORMAL
EXT PANCREATIC POLYPEPTIDE: ABNORMAL
EXT PHOSPHORUS: ABNORMAL
EXT PLATELETS: 68 X10(9)/L (ref 150–450)
EXT POTASSIUM: 4.6 MEQ/L (ref 3.4–5)
EXT PROGRAF LEVEL: ABNORMAL
EXT PROLACTIN: ABNORMAL
EXT PROTEIN TOTAL: 8.9 G/DL (ref 6.3–8.2)
EXT PROTEIN UA: ABNORMAL
EXT PT: ABNORMAL
EXT PTH, INTACT: ABNORMAL
EXT PTT: ABNORMAL
EXT RAPAMUNE LEVEL: ABNORMAL
EXT SEROTONIN: ABNORMAL
EXT SODIUM: 139 MEQ/L (ref 137–145)
EXT SOMATOSTATIN: ABNORMAL
EXT SUBSTANCE P: ABNORMAL
EXT TRIGLYCERIDES: ABNORMAL
EXT TRYPTASE: ABNORMAL
EXT TSH: ABNORMAL
EXT URIC ACID: ABNORMAL
EXT URINE AMYLASE U/HR: ABNORMAL
EXT URINE AMYLASE U/L: ABNORMAL
EXT VASOACTIVE INTESTINAL POLYPEPTIDE: ABNORMAL
EXT VITAMIN B12: ABNORMAL
EXT VMA 24 HR URINE: ABNORMAL
EXT WBC: 4.08 X10(9)/L (ref 4–11)
NEURON SPECIFIC ENOLASE: ABNORMAL

## 2020-09-03 ENCOUNTER — TELEPHONE (OUTPATIENT)
Dept: NEUROLOGY | Facility: HOSPITAL | Age: 65
End: 2020-09-03

## 2020-09-03 NOTE — TELEPHONE ENCOUNTER
Returned pt's call. Pt is tearful and put wife on speaker phone. Pt was given 2 months prognosis and given hospice referral today. Pt and wife would like to come see dr Yuen or for us to review CT scan and give second opinion. Wife to overnight scans and will review in tumor board this coming week.

## 2020-09-03 NOTE — TELEPHONE ENCOUNTER
----- Message from Nelda Peoples sent at 9/3/2020  3:17 PM CDT -----  Contact: [phone]/ Self  RR  Patient is requesting to speak with nurse regarding a personal matter. Please advise.

## 2020-09-08 ENCOUNTER — TELEPHONE (OUTPATIENT)
Dept: NEUROLOGY | Facility: HOSPITAL | Age: 65
End: 2020-09-08

## 2020-09-09 ENCOUNTER — OFFICE VISIT (OUTPATIENT)
Dept: NEUROLOGY | Facility: HOSPITAL | Age: 65
End: 2020-09-09
Attending: INTERNAL MEDICINE
Payer: MEDICARE

## 2020-09-09 DIAGNOSIS — R17 ELEVATED BILIRUBIN: ICD-10-CM

## 2020-09-09 DIAGNOSIS — D46.9 MDS (MYELODYSPLASTIC SYNDROME): ICD-10-CM

## 2020-09-09 DIAGNOSIS — C7B.8 SECONDARY NEUROENDOCRINE TUMOR OF DISTANT LYMPH NODES: ICD-10-CM

## 2020-09-09 DIAGNOSIS — C7B.8 SECONDARY NEUROENDOCRINE TUMOR OF BONE: ICD-10-CM

## 2020-09-09 DIAGNOSIS — D69.6 THROMBOCYTOPENIA: ICD-10-CM

## 2020-09-09 DIAGNOSIS — C7A.012 MALIGNANT CARCINOID TUMOR OF THE ILEUM: Primary | ICD-10-CM

## 2020-09-09 DIAGNOSIS — C78.7 SECONDARY MALIGNANT NEOPLASM OF LIVER: ICD-10-CM

## 2020-09-09 PROCEDURE — 99215 OFFICE O/P EST HI 40 MIN: CPT | Mod: 95,,, | Performed by: INTERNAL MEDICINE

## 2020-09-09 PROCEDURE — 99215 PR OFFICE/OUTPT VISIT, EST, LEVL V, 40-54 MIN: ICD-10-PCS | Mod: 95,,, | Performed by: INTERNAL MEDICINE

## 2020-09-09 NOTE — PROGRESS NOTES
NOLANETS:  [facility]  A collaboration between [facility] and [facility]    PATIENT: Tereso Simms  MRN: [id number]  DATE: 9/9/2020      Diagnosis:   1. Malignant carcinoid tumor of the ileum    2. Secondary neuroendocrine tumor of distant lymph nodes    3. Secondary malignant neoplasm of liver    4. Secondary neuroendocrine tumor of bone    5. Thrombocytopenia    6. MDS (myelodysplastic syndrome)    7. Elevated bilirubin        Chief Complaint: Follow-up      Oncologic History:      Oncologic History Small intestine neuroendocrine tumor diagnosed 10/2000  Recurrent disease to liver 9/2008  Recurrent disease to bone 2015    Oncologic Treatment Small bowel resection, 10/2000  Cytoreduction 2/2009 ([place])  Sandostatin 2/2012  Radioembolization 5/2015  Parmer embolization 7/2015, 8/2015  Lanreotide 8/2015  CAPTEM 7/2016 - 9/2016  TACE 11/2016  CAPTEM 11/2016 - 2/2019   TACE 3/2107  TACE 9/2017  Kerry-177 1/2020    Pathology 10/2000 well-differentiated  2/2008 Ki-67 less than 1%      The patient location is: home  The chief complaint leading to consultation is: follow up  Visit type: audiovisual  Total time spent with patient: 60 min  Each patient to whom he or she provides medical services by telemedicine is:  (1) informed of the relationship between the physician and patient and the respective role of any other health care provider with respect to management of the patient; and (2) notified that he or she may decline to receive medical services by telemedicine and may withdraw from such care at any time.    Notes:       Subjective:    Interval History: Mr. Simms is a 65 y.o. male who is seen  in follow-up for a small intestine neuroendocrine tumor.  Since I had seen him last he was diagnosed with myelodysplastic syndrome and started on treatment with Revlimid.  After his initial treatment he started to develop symptoms of  severe fatigue.  He was also noted to have abdominal distension and overall a decrease in his activity level.  In 08/2020 he underwent a CT indicating widespread progressive disease within the liver coupled with development of ascites.  Labs had indicated a bilirubin of 3.5 and elevation of alkaline phosphatase.  He had followed up with Dr. Ramires who reviewed these findings with him and recommended hospice.  He reached out to us to determine if we felt there were any other treatment options available.  He states that his main complaints are related to ongoing fatigue.  He denies any pain.  He does state that he did have some abdominal distension and recently underwent a paracentesis with removal of 2 L of fluid which did help this.  He has a good appetite but is activity level has decreased.  He admits to being in the bed or the chair at more than 50% of the day.  He also admits to some difficulty sleeping coupled with anxiety and depression.  He states that he is not ready to give up and wishes to try anything that may be able to help him.  He had reached out to a holistic physician who recommended low-dose naltrexone and also milk thistle.      He has previously seen Jimenez Nava and Malaika.  His history dates to 10/2000 when he was diagnosed with a terminal ileal  Carcinoid tumor.  He underwent a small bowel resection in 10/2000 with pathology revealing multiple carcinoid tumors with some largest measuring 4 cm. 21 out of 38 lymph nodes were positive for tumor.  There was no evidence of distant disease at that time. He did well with surveillance until 2008 when an octreotide scan had shown uptake in the right lobe of the liver. He underwent cytoreduction by Dr. Urban with pathology showing a neuroendocrine tumor with Ki-67 less than 1%. He was initiated on Sandostatin in 2/2012.  In 2015 he underwent radioembolization as well as bland embolization. He also was initiated on Lanreotide.  He was initiated on  CAPTEM in 7/2016.  He underwent additional TACE in 11/2016, 3/2017 and 9/2017.  CAPTEM was discontinued in 02/2019 in order to give him a chemo holiday.  He was started on PRRT with Kerry-177 in 01/2020      Past Medical History:   Past Medical History:   Diagnosis Date    Diabetes     Diabetes mellitus, type 2     Elevated bilirubin 9/9/2020    Hypertension     Malignant carcinoid tumor of the ileum 10/05/2000    MDS (myelodysplastic syndrome) 9/9/2020    Secondary neuroendocrine tumor of bone(209.73) 11/15/2016    Secondary neuroendocrine tumor of liver     Secondary neuroendocrine tumor of other sites     pancreas    Thrombocytopenia 5/13/2020       Past Surgical HIstory:   Past Surgical History:   Procedure Laterality Date    APPENDECTOMY      CEREBRAL ANEURYSM REPAIR      clipped-NO MRI    right hepatic partial lobectomy, t non anatomic tumors removed, RFA right lobe, cholecystectomy, excision of pancreatic tumor, lysis of adhesions,  2[facility]25/2009[facility]    small bowel resection, lymph node resection, 21/38 positive, right colectomy, lymphovascular, perineural invasion  10/2000    TACE  11/2016    [facility]       Family History:   Family History   Problem Relation Age of Onset    Heart failure Father     Diabetes Father     COPD Mother     Cancer Mother         RCC    Cancer Sister         breast       Social History:  reports that he has never smoked. He has never used smokeless tobacco. He reports current alcohol use. He reports that he does not use drugs.    Allergies:  Review of patient's allergies indicates:   Allergen Reactions    Epinephrine Other (See Comments)     Precipitates a carcinoid crisis       Medications:  Current Outpatient Medications   Medication Sig Dispense Refill    ascorbic acid, vitamin C, (VITAMIN C) 1000 MG tablet Take 1,000 mg by mouth once daily.      calcium carbonate (TUMS) 200 mg calcium (500 mg) chewable tablet Take 1 tablet by  mouth once daily.      capecitabine (XELODA) 500 MG Tab Take 500 mg by mouth 2 (two) times daily. Pt ttake 1650mg in am/ 1500mg at dinner -- 14 days on, 14 days off      cholecalciferol, vitamin D3, 2,000 unit Tab 1 TABLET DAILY      lanreotide (SOMATULINE DEPOT) 120 mg/0.5 mL Syrg Inject 120 mg into the skin every 30 days.      lisinopril (PRINIVIL,ZESTRIL) 5 MG tablet Take 5 mg by mouth once daily.      loperamide (IMODIUM) 1 mg/5 mL solution Take by mouth 2 (two) times daily.      ondansetron (ZOFRAN) 4 MG tablet Take 1 tablet (4 mg total) by mouth every 6 (six) hours as needed for Nausea. 40 tablet 0    ondansetron (ZOFRAN-ODT) 4 MG TbDL Take 1 tablet (4 mg total) by mouth every 6 (six) hours as needed. 25 tablet 0    sitagliptan-metformin (JANUMET) 50-1,000 mg per tablet Take 1 tablet by mouth 2 (two) times daily with meals.      temozolomide (TEMODAR) 100 MG capsule Take 300 mg/m2/day by mouth every evening. Pt take 300mg 5 days out of a month      vitamin D 1000 units Tab Take 185 mg by mouth once daily.      ZOLEDRONIC ACID (ZOMETA IV) Inject into the vein every 3 (three) months.        No current facility-administered medications for this visit.        Review of Systems   Constitutional: Positive for activity change and fatigue. Negative for appetite change, chills, fever and unexpected weight change.   HENT: Negative for dental problem, sinus pressure and sneezing.    Eyes: Negative for visual disturbance.   Respiratory: Negative for cough, choking, chest tightness and shortness of breath.    Cardiovascular: Negative for chest pain and leg swelling.   Gastrointestinal: Negative for abdominal pain, blood in stool, constipation, diarrhea and nausea.   Genitourinary: Negative for difficulty urinating and dysuria.   Musculoskeletal: Positive for back pain. Negative for arthralgias.   Skin: Negative for rash and wound.   Neurological: Negative for dizziness, light-headedness and headaches.    Hematological: Negative for adenopathy. Does not bruise/bleed easily.   Psychiatric/Behavioral: Positive for sleep disturbance. The patient is nervous/anxious.        ECOG Performance Status: 3   Objective:      Vitals:   There were no vitals filed for this visit.  BMI: There is no height or weight on file to calculate BMI.    Physical Exam  Constitutional:       Appearance: He is well-developed. He is ill-appearing.   Neurological:      Mental Status: He is alert and oriented to person, place, and time.   Psychiatric:         Mood and Affect: Affect is tearful.         Laboratory Data:  Abstract on 09/08/2020   Component Date Value Ref Range Status    EXT WBC 08/21/2020 4.08  4.00 - 11.0 x10(9)/L Final    EXT Hemoglobin 08/21/2020 11.1* 13.5 - 17.5 g/dL Final    EXT Hematocrit 08/21/2020 34.7* 38.0 - 52.0 % Final    EXT Platelets 08/21/2020 68* 150 - 450 x10(9)/L Final    EXT Glucose 08/21/2020 149* 75 - 125 mg/dL Final    EXT BUN 08/21/2020 20  9 - 20 mg/dL Final    EXT Creatinine 08/21/2020 0.90  0.66 - 1.25 mg/dL Final    EXT Sodium 08/21/2020 139  137 - 145 mEq/L Final    EXT Potassium 08/21/2020 4.6  3.4 - 5.0 mEq/L Final    EXT Chloride 08/21/2020 106  100 - 110 mEq/L Final    EXT CO2 08/21/2020 25.0  22.0 - 30.0 mEq/L Final    EXT Calcium 08/21/2020 8.5  8.4 - 10.2 mg/dL Final    EXT Protein total 08/21/2020 8.9* 6.3 - 8.2 g/dL Final    EXT Albumin 08/21/2020 3.5  3.5 - 5.0 g/dL Final    EXT BilirubiN Total 08/21/2020 3.5* 0.2 - 1.3 mg/dL Final    EXT Alkaline Phosphatase 08/21/2020 785* 38 - 126 mg/dL Final       Imaging:   Outside image reviewed.               Assessment:       1. Malignant carcinoid tumor of the ileum    2. Secondary neuroendocrine tumor of distant lymph nodes    3. Secondary malignant neoplasm of liver    4. Secondary neuroendocrine tumor of bone    5. Thrombocytopenia    6. MDS (myelodysplastic syndrome)    7. Elevated bilirubin           Plan:     I have had a long  discussion with Mr. Simms, his wife and his son today concerning his diseases.  They have had an opportunity to look at his most recent CT scan and also compare this to prior CTs indicating widespread progressive disease within the liver.  This is in the setting of new onset ascites and hyperbilirubinemia.  He did follow with Dr. Ramires who had recommended hospice and I have told him that I agree with this assessment is I do not think that further treatment would be beneficial at this point.  We have discussed multiple reasons why this would not be possible including his worsening performance status along with myelodysplastic syndrome.  I have told him that he likely has transformed into a more aggressive neuroendocrine tumor and therefore systemic treatment with chemotherapy would likely be indicated but we could not administer this with his thrombocytopenia and elevation of bilirubin which is likely secondary to his widespread liver disease.  He asked about supplements including low-dose naltrexone and milk thistle and I do not have any evidence to support the use of this.  He also asked about potential liver directed therapy but have also told him that given his liver dysfunction that liver directed therapy would not be safe.  He also asked about potential liver transplant and have given him several different reasons why he would not be a candidate for this.  He is fearful that if he goes on hospice that his physicians will no longer be able to care for him and I have outlined the fact that he would not be able to receive some of the treatments he may be receiving now such is lanreotide.  I do not think lanreotide for tumor control is benefiting him at all but if he is having any symptoms such as diarrhea or flushing then lanreotide may be helpful.  I have also told him that exercise may be beneficial to combat his fatigue and potentially a visit with a psychologist to help with anxiety and depression.  Finally, I  have told him we would discuss his case in tumor Board next week so that he feels comfortable that this whole team has reviewed his case to make sure no stone was left unturned.  I told him we would contact him afterwards with recommendations.  Multiple questions were answered and he is agreeable with this plan.      Tereso Yuen DO, [facility]  Hematology & Oncology, [facility]  [address], [address]  [place]  [address]  ph. [phone]; [phone]  fax. [phone]    60 minutes were spent in coordination of patient's care, record review and counseling.  More than 50% of the time was face-to-face.

## 2020-09-09 NOTE — LETTER
September 9, 2020        MD Beau Khan & [facility] [id number] [facility]  [address]  [place] [address]  Phone: [phone]  Fax: [phone]   Patient: Tereso Simms   MR Number: [id number]   YOB: 1955   Date of Visit: 9/9/2020       Dear Dr. Ramires:    Thank you for referring Tereso Simms to me for evaluation. Attached you will find relevant portions of my assessment and plan of care.    If you have questions, please do not hesitate to call me. I look forward to following Tereso Simms along with you.    Sincerely,      Tereso Yuen DO, FACP            CC  WILLIAM Urban MD    [facility]

## 2022-04-19 NOTE — TELEPHONE ENCOUNTER
Gave tumor board recs, sent to Dr. Ramires. Patient said they weren't sure if there was scar tissue causing his blockage.  I told him we would request the full image.  He was just concerned because they mentioned surgery or PRRT.      He said he will make an appt because Dr. Ramires told him the ceiling for CAPTEM is 2 years then PRRT and he wants to make sure this isn't the case.      4 = No assist / stand by assistance